# Patient Record
Sex: FEMALE | Race: BLACK OR AFRICAN AMERICAN | NOT HISPANIC OR LATINO | Employment: FULL TIME | URBAN - METROPOLITAN AREA
[De-identification: names, ages, dates, MRNs, and addresses within clinical notes are randomized per-mention and may not be internally consistent; named-entity substitution may affect disease eponyms.]

---

## 2024-07-12 ENCOUNTER — OFFICE VISIT (OUTPATIENT)
Dept: FAMILY MEDICINE CLINIC | Facility: CLINIC | Age: 29
End: 2024-07-12
Payer: COMMERCIAL

## 2024-07-12 VITALS
TEMPERATURE: 97.2 F | HEIGHT: 66 IN | BODY MASS INDEX: 31.6 KG/M2 | HEART RATE: 79 BPM | RESPIRATION RATE: 18 BRPM | SYSTOLIC BLOOD PRESSURE: 120 MMHG | DIASTOLIC BLOOD PRESSURE: 80 MMHG | OXYGEN SATURATION: 100 % | WEIGHT: 196.6 LBS

## 2024-07-12 DIAGNOSIS — N92.1 MENORRHAGIA WITH IRREGULAR CYCLE: ICD-10-CM

## 2024-07-12 DIAGNOSIS — D50.9 IRON DEFICIENCY ANEMIA, UNSPECIFIED IRON DEFICIENCY ANEMIA TYPE: ICD-10-CM

## 2024-07-12 DIAGNOSIS — R35.0 URINARY FREQUENCY: ICD-10-CM

## 2024-07-12 DIAGNOSIS — Z13.1 DIABETES MELLITUS SCREENING: ICD-10-CM

## 2024-07-12 DIAGNOSIS — Z13.220 LIPID SCREENING: ICD-10-CM

## 2024-07-12 DIAGNOSIS — R53.83 FATIGUE, UNSPECIFIED TYPE: ICD-10-CM

## 2024-07-12 DIAGNOSIS — Z76.89 ENCOUNTER TO ESTABLISH CARE: Primary | ICD-10-CM

## 2024-07-12 DIAGNOSIS — Z33.2 ABORTION IN FIRST TRIMESTER: ICD-10-CM

## 2024-07-12 LAB
SL AMB  POCT GLUCOSE, UA: NEGATIVE
SL AMB LEUKOCYTE ESTERASE,UA: NEGATIVE
SL AMB POCT BILIRUBIN,UA: NEGATIVE
SL AMB POCT BLOOD,UA: ABNORMAL
SL AMB POCT CLARITY,UA: ABNORMAL
SL AMB POCT COLOR,UA: ABNORMAL
SL AMB POCT KETONES,UA: 5
SL AMB POCT NITRITE,UA: NEGATIVE
SL AMB POCT PH,UA: 6
SL AMB POCT SPECIFIC GRAVITY,UA: 1
SL AMB POCT URINE PROTEIN: 15
SL AMB POCT UROBILINOGEN: 0.2

## 2024-07-12 PROCEDURE — 81002 URINALYSIS NONAUTO W/O SCOPE: CPT | Performed by: PHYSICIAN ASSISTANT

## 2024-07-12 PROCEDURE — 99203 OFFICE O/P NEW LOW 30 MIN: CPT | Performed by: PHYSICIAN ASSISTANT

## 2024-07-12 NOTE — PROGRESS NOTES
FAMILY PRACTICE OFFICE VISIT  [facility] - [facility]       NAME: Yun Mancia  AGE: 29 y.o. SEX: female       : 1995        MRN: [id number]    DATE: 2024  TIME: 3:30 PM    Assessment and Plan     Problem List Items Addressed This Visit          Blood    Iron deficiency anemia     Check CBC and iron studies.  Referred to hematology for possible iron infusions.         Relevant Orders    Ambulatory Referral to Hematology / Oncology    CBC and differential    Iron Panel (Includes Ferritin, Iron Sat%, Iron, and TIBC)       Obstetrics/Gynecology     in first trimester     Reports having an  via taking a pill.  She reports having 2 heavy menses since then.  Concern for possible complications.  Referred to OB/GYN.  Check hCG.         Relevant Orders    Ambulatory Referral to Obstetrics / Gynecology       Other    Urinary frequency     Check urine dip.  Positive for blood but no other signs of infection.  Sent for culture.         Relevant Orders    POCT urine dip (Completed)    Urine culture (Completed)    Fatigue     Labs as ordered.         Relevant Orders    TSH, 3rd generation with Free T4 reflex     Other Visit Diagnoses       Encounter to establish care    -  Primary    Menorrhagia with irregular cycle        Relevant Orders    Ambulatory Referral to Obstetrics / Gynecology    CBC and differential    hCG, quantitative    Lipid screening        Relevant Orders    Lipid Panel with Direct LDL reflex    Diabetes mellitus screening        Relevant Orders    Comprehensive metabolic panel                  Chief Complaint     Chief Complaint   Patient presents with    Establish Care       History of Present Illness   Yun Mancia is a 29 y.o.-year-old female who presents for establishing care.     Patient just moved here in April from [place].     Patient notes that she has anemia - has low iron and has been feeling tired - notes that she had a  transfusion - was taking iron twice daily but ran out 2 weeks ago     Notes that she has concern about an  (pill) that she had May 17 (was about 6 weeks) and then had US early  that showed a blood clot in the uterus - has had 2 heavy menses since then (notes hx of irreg menses with heavy flow about 2 times a month) - not having breast tenderness - when not having period, notes that she has a white discharge that smells abnormal - also notes that she needs to urinate frequently since end                Review of Systems   Review of Systems   Constitutional:  Negative for chills and fever.   HENT:  Negative for congestion, postnasal drip and rhinorrhea.    Respiratory:  Negative for shortness of breath.    Cardiovascular:  Negative for chest pain and palpitations.   Gastrointestinal:  Negative for abdominal pain, diarrhea, nausea and vomiting.   Genitourinary:  Positive for menstrual problem.   Neurological:  Positive for light-headedness and headaches. Negative for dizziness and syncope.   Psychiatric/Behavioral:  Negative for dysphoric mood. The patient is nervous/anxious.        Active Problem List     Patient Active Problem List   Diagnosis    Iron deficiency anemia    Retained products of conception after miscarriage    Urinary frequency     in first trimester    Fatigue         Past Medical History:  Past Medical History:   Diagnosis Date    Anemia        Past Surgical History:  Past Surgical History:   Procedure Laterality Date     SECTION      DILATION AND CURETTAGE OF UTERUS N/A 2024    Procedure: DILATATION AND CURETTAGE (D&C) SUCTION PRODUCT OF CONCEPTION;  Surgeon: Pedro Luis Oropeza MD;  Location:  MAIN OR;  Service: Gynecology    WISDOM TOOTH EXTRACTION      x4 - age 27       Family History:  Family History   Problem Relation Age of Onset    No Known Problems Mother     No Known Problems Father     No Known Problems Sister     No Known Problems Brother     No  Known Problems Brother     No Known Problems Brother     Autism Son     ADD / ADHD Son     No Known Problems Daughter     Asthma Daughter     Anemia Maternal Grandmother     Hypertension Maternal Grandmother     Hyperlipidemia Maternal Grandmother     Diabetes Maternal Grandmother     Hypertension Paternal Grandmother     Hyperlipidemia Paternal Grandmother     Diabetes Paternal Grandmother     Anemia Maternal Aunt        Social History:  Social History     Socioeconomic History    Marital status: Single     Spouse name: Not on file    Number of children: Not on file    Years of education: Not on file    Highest education level: Not on file   Occupational History    Not on file   Tobacco Use    Smoking status: Never    Smokeless tobacco: Never   Vaping Use    Vaping status: Never Used   Substance and Sexual Activity    Alcohol use: Yes     Comment: socially - once every few months    Drug use: Never    Sexual activity: Yes     Partners: Male   Other Topics Concern    Not on file   Social History Narrative    Not on file     Social Determinants of Health     Financial Resource Strain: Low Risk  (7/13/2022)    Received from [facility]    Overall Financial Resource Strain (CARDIA)     Difficulty of Paying Living Expenses: Not hard at all   Food Insecurity: No Food Insecurity (7/13/2022)    Received from [facility]    Food Insecurity     Within the past 12 months, you worried that your food would run out before you got the money to buy more.: 1     Within the past 12 months, the food you bought just didn't last and you didn't have money to get more.: 1   Transportation Needs: No Transportation Needs (7/14/2022)    Received from [facility]    Transportation Needs     In the past 12 months, has lack of transportation kept you from medical appointments or from getting medications?: 2     In the past 12 months, has lack of transportation kept you from meetings, work, or from getting things needed for daily living?: 2  "  Physical Activity: Insufficiently Active (7/13/2022)    Received from [facility]    Physical Activity     On average, how many days per week do you engage in moderate to strenuous exercise (like a brisk walk)?: 1 day     On average, how many minutes do you engage in exercise at this level?: 30 min   Stress: Stress Concern Present (7/13/2022)    Received from [facility]    [employer] [place] [employer] - Occupational Stress Questionnaire     Feeling of Stress : Rather much   Social Connections: Socially Isolated (7/13/2022)    Received from [facility]    Social Connection and Isolation Panel [NHANES]     Frequency of Communication with Friends and Family: Never     Frequency of Social Gatherings with Friends and Family: Never     Attends Latter-day Services: Never     Active Member of Clubs or Organizations: No     Attends Club or Organization Meetings: Never     Marital Status:    Intimate Partner Violence: Not At Risk (8/16/2023)    Received from [facility]     Threatened: Not on file     Insulted: Not on file     Physically Hurt : Not on file     Scream: Not on file   Housing Stability: At Risk (8/16/2023)    Received from [facility]     Living Situation: Not on file     Housing Problems: Not on file       Objective     Vitals:    07/12/24 1200   BP: 120/80   BP Location: Left arm   Cuff Size: Large   Pulse: 79   Resp: 18   Temp: (!) 97.2 °F (36.2 °C)   TempSrc: Tympanic   SpO2: 100%   Weight: 89.2 kg (196 lb 9.6 oz)   Height: 5' 6\" (1.676 m)     Wt Readings from Last 3 Encounters:   07/12/24 89.2 kg (196 lb 9.6 oz)       Physical Exam  Vitals reviewed.   Constitutional:       General: She is not in acute distress.     Appearance: Normal appearance. She is well-developed. She is obese. She is not ill-appearing.   HENT:      Head: Normocephalic and atraumatic.   Neck:      Thyroid: No thyromegaly.   Cardiovascular:      Rate and Rhythm: Normal rate and " regular rhythm.      Pulses: Normal pulses.      Heart sounds: Normal heart sounds. No murmur heard.  Pulmonary:      Effort: Pulmonary effort is normal.      Breath sounds: Normal breath sounds. No wheezing, rhonchi or rales.   Abdominal:      General: Bowel sounds are normal. There is no distension.      Palpations: Abdomen is soft. There is no mass.      Tenderness: There is no abdominal tenderness. There is no right CVA tenderness, left CVA tenderness or guarding.   Musculoskeletal:      Cervical back: Neck supple.      Right lower leg: No edema.      Left lower leg: No edema.   Lymphadenopathy:      Cervical: No cervical adenopathy.   Skin:     General: Skin is warm and dry.   Neurological:      Mental Status: She is alert.   Psychiatric:         Mood and Affect: Mood normal.         Behavior: Behavior normal.         Thought Content: Thought content normal.         Judgment: Judgment normal.         Pertinent Laboratory/Diagnostic Studies:  Results for orders placed or performed in visit on 07/12/24   Urine culture    Specimen: Urine, Clean Catch   Result Value Ref Range    Urine Culture No Growth <1000 cfu/mL    POCT urine dip   Result Value Ref Range    LEUKOCYTE ESTERASE,UA negative     NITRITE,UA negative     SL AMB POCT UROBILINOGEN 0.2     POCT URINE PROTEIN 15      PH,UA 6.0     BLOOD,UA +++     SPECIFIC GRAVITY,UA 1.005     KETONES,UA 5     BILIRUBIN,UA negative     GLUCOSE, UA negative      COLOR,UA pinkish (patient has her period)     CLARITY,UA cloudy        Orders Placed This Encounter   Procedures    Urine culture    Comprehensive metabolic panel    CBC and differential    Lipid Panel with Direct LDL reflex    TSH, 3rd generation with Free T4 reflex    hCG, quantitative    Ambulatory Referral to Hematology / Oncology    Ambulatory Referral to Obstetrics / Gynecology    POCT urine dip       ALLERGIES:  No Known Allergies    Current Medications     No current outpatient medications on file.     No  current facility-administered medications for this visit.         Health Maintenance     Health Maintenance   Topic Date Due    Hepatitis C Screening  Never done    Depression Follow-up Plan  Never done    HIV Screening  Never done    Annual Physical  Never done    DTaP,Tdap,and Td Vaccines (1 - Tdap) Never done    Cervical Cancer Screening  Never done    COVID-19 Vaccine (1 - 2023-24 season) Never done    Influenza Vaccine (1) 09/01/2024    Depression Screening  07/12/2025    Zoster Vaccine (1 of 2) 04/05/2046    RSV Vaccine Age 60+ Years (1 - 1-dose 60+ series) 04/05/2056    RSV Vaccine age 0-20 Months  Aged Out    Pneumococcal Vaccine: Pediatrics (0 to 5 Years) and At-Risk Patients (6 to 64 Years)  Aged Out    HIB Vaccine  Aged Out    IPV Vaccine  Aged Out    Hepatitis A Vaccine  Aged Out    Meningococcal ACWY Vaccine  Aged Out    HPV Vaccine  Aged Out       There is no immunization history on file for this patient.    Flor Sheikh PA-C  8/4/2024 3:30 PM  [facility]

## 2024-07-13 LAB — BACTERIA UR CULT: NORMAL

## 2024-07-15 ENCOUNTER — HOSPITAL ENCOUNTER (EMERGENCY)
Facility: HOSPITAL | Age: 29
Discharge: HOME | End: 2024-07-16
Attending: EMERGENCY MEDICINE
Payer: COMMERCIAL

## 2024-07-15 ENCOUNTER — TELEPHONE (OUTPATIENT)
Dept: FAMILY MEDICINE CLINIC | Facility: CLINIC | Age: 29
End: 2024-07-15

## 2024-07-15 ENCOUNTER — APPOINTMENT (EMERGENCY)
Dept: RADIOLOGY | Facility: HOSPITAL | Age: 29
End: 2024-07-15
Payer: COMMERCIAL

## 2024-07-15 ENCOUNTER — NURSE TRIAGE (OUTPATIENT)
Age: 29
End: 2024-07-15

## 2024-07-15 DIAGNOSIS — O03.4 RETAINED PRODUCTS OF CONCEPTION AFTER MISCARRIAGE: Primary | ICD-10-CM

## 2024-07-15 DIAGNOSIS — N93.9 ABNORMAL VAGINAL BLEEDING: ICD-10-CM

## 2024-07-15 LAB
ALBUMIN SERPL BCG-MCNC: 3.5 G/DL (ref 3.5–5)
ALP SERPL-CCNC: 47 U/L (ref 34–104)
ALT SERPL W P-5'-P-CCNC: 10 U/L (ref 7–52)
ANION GAP SERPL CALCULATED.3IONS-SCNC: 8 MMOL/L (ref 4–13)
AST SERPL W P-5'-P-CCNC: 12 U/L (ref 13–39)
B-HCG SERPL-ACNC: 5.3 MIU/ML (ref 0–5)
BASOPHILS # BLD AUTO: 0.07 THOUSANDS/ÂΜL (ref 0–0.1)
BASOPHILS NFR BLD AUTO: 1 % (ref 0–1)
BILIRUB SERPL-MCNC: 0.18 MG/DL (ref 0.2–1)
BUN SERPL-MCNC: 10 MG/DL (ref 5–25)
CALCIUM SERPL-MCNC: 8.8 MG/DL (ref 8.4–10.2)
CHLORIDE SERPL-SCNC: 106 MMOL/L (ref 96–108)
CO2 SERPL-SCNC: 25 MMOL/L (ref 21–32)
CREAT SERPL-MCNC: 0.61 MG/DL (ref 0.6–1.3)
EOSINOPHIL # BLD AUTO: 0.12 THOUSAND/ÂΜL (ref 0–0.61)
EOSINOPHIL NFR BLD AUTO: 2 % (ref 0–6)
ERYTHROCYTE [DISTWIDTH] IN BLOOD BY AUTOMATED COUNT: 16.1 % (ref 11.6–15.1)
FERRITIN SERPL-MCNC: 3 NG/ML (ref 11–307)
GFR SERPL CREATININE-BSD FRML MDRD: 123 ML/MIN/1.73SQ M
GLUCOSE SERPL-MCNC: 95 MG/DL (ref 65–140)
HCT VFR BLD AUTO: 25.4 % (ref 34.8–46.1)
HGB BLD-MCNC: 7.6 G/DL (ref 11.5–15.4)
IMM GRANULOCYTES # BLD AUTO: 0.01 THOUSAND/UL (ref 0–0.2)
IMM GRANULOCYTES NFR BLD AUTO: 0 % (ref 0–2)
IRON SATN MFR SERPL: 5 % (ref 15–50)
IRON SERPL-MCNC: 20 UG/DL (ref 50–212)
LYMPHOCYTES # BLD AUTO: 2.65 THOUSANDS/ÂΜL (ref 0.6–4.47)
LYMPHOCYTES NFR BLD AUTO: 46 % (ref 14–44)
MCH RBC QN AUTO: 24.1 PG (ref 26.8–34.3)
MCHC RBC AUTO-ENTMCNC: 29.9 G/DL (ref 31.4–37.4)
MCV RBC AUTO: 81 FL (ref 82–98)
MONOCYTES # BLD AUTO: 0.47 THOUSAND/ÂΜL (ref 0.17–1.22)
MONOCYTES NFR BLD AUTO: 8 % (ref 4–12)
NEUTROPHILS # BLD AUTO: 2.5 THOUSANDS/ÂΜL (ref 1.85–7.62)
NEUTS SEG NFR BLD AUTO: 43 % (ref 43–75)
NRBC BLD AUTO-RTO: 0 /100 WBCS
PLATELET # BLD AUTO: 487 THOUSANDS/UL (ref 149–390)
PMV BLD AUTO: 8.7 FL (ref 8.9–12.7)
POTASSIUM SERPL-SCNC: 3.8 MMOL/L (ref 3.5–5.3)
PROT SERPL-MCNC: 6.5 G/DL (ref 6.4–8.4)
RBC # BLD AUTO: 3.15 MILLION/UL (ref 3.81–5.12)
SODIUM SERPL-SCNC: 139 MMOL/L (ref 135–147)
TIBC SERPL-MCNC: 389 UG/DL (ref 250–450)
UIBC SERPL-MCNC: 369 UG/DL (ref 155–355)
WBC # BLD AUTO: 5.82 THOUSAND/UL (ref 4.31–10.16)

## 2024-07-15 PROCEDURE — 85025 COMPLETE CBC W/AUTO DIFF WBC: CPT | Performed by: STUDENT IN AN ORGANIZED HEALTH CARE EDUCATION/TRAINING PROGRAM

## 2024-07-15 PROCEDURE — 82728 ASSAY OF FERRITIN: CPT | Performed by: STUDENT IN AN ORGANIZED HEALTH CARE EDUCATION/TRAINING PROGRAM

## 2024-07-15 PROCEDURE — 99284 EMERGENCY DEPT VISIT MOD MDM: CPT

## 2024-07-15 PROCEDURE — 83540 ASSAY OF IRON: CPT | Performed by: STUDENT IN AN ORGANIZED HEALTH CARE EDUCATION/TRAINING PROGRAM

## 2024-07-15 PROCEDURE — 80053 COMPREHEN METABOLIC PANEL: CPT | Performed by: STUDENT IN AN ORGANIZED HEALTH CARE EDUCATION/TRAINING PROGRAM

## 2024-07-15 PROCEDURE — 84702 CHORIONIC GONADOTROPIN TEST: CPT | Performed by: STUDENT IN AN ORGANIZED HEALTH CARE EDUCATION/TRAINING PROGRAM

## 2024-07-15 PROCEDURE — 76856 US EXAM PELVIC COMPLETE: CPT

## 2024-07-15 PROCEDURE — 36415 COLL VENOUS BLD VENIPUNCTURE: CPT | Performed by: STUDENT IN AN ORGANIZED HEALTH CARE EDUCATION/TRAINING PROGRAM

## 2024-07-15 PROCEDURE — 76830 TRANSVAGINAL US NON-OB: CPT

## 2024-07-15 PROCEDURE — 83550 IRON BINDING TEST: CPT | Performed by: STUDENT IN AN ORGANIZED HEALTH CARE EDUCATION/TRAINING PROGRAM

## 2024-07-15 NOTE — TELEPHONE ENCOUNTER
"Faby called to schedule NP appt to f/u to chemical EAB 2024 with [facility].  She is new to the area and is looking to estabish care with OB/GYN.  She has been bleeding since EAB- reports soaking 6 super tampons daily.  Reports weakness w/ h/o anemia/transfusion done in March.   Received + HPT 2 weeks ago and again this week.    Advised to be evaluated in ED due to heavy bleeding for almost 2 months w/h/o anemia and blood transfusion in 2024.  +HPT after chemical induced EAB.    Pateint in agreement, will call to schedule f/u with GYN once released from ED.         Reason for Disposition  • MODERATE vaginal bleeding (e.g., soaking pad or tampon per hour and present > 6 hours; 1 menstrual cup every 6 hours)    Answer Assessment - Initial Assessment Questions  1. AMOUNT: \"Describe the bleeding that you are having.\"     - SPOTTING: spotting, or pinkish / brownish mucous discharge; does not fill panti-liner or pad     - MILD:  less than 1 pad / hour; less than patient's usual menstrual bleeding    - MODERATE: 1-2 pads / hour; 1 menstrual cup every 6 hours; small-medium blood clots (e.g., pea, grape, small coin)    - SEVERE: soaking 2 or more pads/hour for 2 or more hours; 1 menstrual cup every 2 hours; bleeding not contained by pads or continuous red blood from vagina; large blood clots (e.g., golf ball, large coin)       Moderate to severe- changing large size tampon 6 x times daily   2. ONSET: \"When did the bleeding begin?\" \"Is it continuing now?\"      Since May 17-at time of chemical   3. MENSTRUAL PERIOD: \"When was the last normal menstrual period?\" \"How is this different than your period?\"      Has not had period since prior to pregnancy and chemical induced AB  4. REGULARITY: \"How regular are your periods?\"      N/a  5. ABDOMINAL PAIN: \"Do you have any pain?\" \"How bad is the pain?\"  (e.g., Scale 1-10; mild, moderate, or severe)    - MILD (1-3): doesn't interfere with normal activities, abdomen soft and " "not tender to touch     - MODERATE (4-7): interferes with normal activities or awakens from sleep, tender to touch     - SEVERE (8-10): excruciating pain, doubled over, unable to do any normal activities cr      Cramping type discomfort  6. PREGNANCY: \"Could you be pregnant?\" \"Are you sexually active?\" \"Did you recently give birth?\"      SAB 6/17/2024  7. BREASTFEEDING: \"Are you breastfeeding?\"      N/a  8. HORMONES: \"Are you taking any hormone medications, prescription or OTC?\" (e.g., birth control pills, estrogen)      N/a  9. BLOOD THINNERS: \"Do you take any blood thinners?\" (e.g., Coumadin/warfarin, Pradaxa/dabigatran, aspirin)      N/a  10. CAUSE: \"What do you think is causing the bleeding?\" (e.g., recent gyn surgery, recent gyn procedure; known bleeding disorder, cervical cancer, polycystic ovarian disease, fibroids)          Recent SAB at [facility]  11. HEMODYNAMIC STATUS: \"Are you weak or feeling lightheaded?\" If Yes, ask: \"Can you stand and walk normally?\"         Weakness- h/o iron transfusion in March due to low hemoglobin/anemia    Protocols used: Vaginal Bleeding - Abnormal-ADULT-OH    "

## 2024-07-15 NOTE — Clinical Note
Yun Mancia was seen and treated in our emergency department on 7/15/2024.    No restrictions            Diagnosis:     Yun  may return to work on return date.    She may return on this date: 07/18/2024         If you have any questions or concerns, please don't hesitate to call.      Diony Zapata MD    ______________________________           _______________          _______________  Hospital Representative                              Date                                Time

## 2024-07-15 NOTE — TELEPHONE ENCOUNTER
----- Message from Flor Sheikh PA-C sent at 7/14/2024  4:50 PM EDT -----  Please let the patient know that her urine culture did not show any bacterial growth.  There is no UTI.  Please encourage her to reach out with any ongoing symptoms or concerns.

## 2024-07-15 NOTE — TELEPHONE ENCOUNTER
Regarding: bleeding goes through 6 large tampons in 6 hours daily  ----- Message from Catrachita DUFF sent at 7/15/2024 11:54 AM EDT -----  Pt called in , PCP had requested stat referral for this pt, bleeding goes through 6 large tampons in 6 hours, had  in May and would not stop bleeding.

## 2024-07-15 NOTE — TELEPHONE ENCOUNTER
Called Pt to discuss labs no answer LM advising CB. Upon return please go over PCP message above/below

## 2024-07-16 ENCOUNTER — ANESTHESIA (EMERGENCY)
Dept: PERIOP | Facility: HOSPITAL | Age: 29
End: 2024-07-16
Payer: COMMERCIAL

## 2024-07-16 ENCOUNTER — ANESTHESIA EVENT (EMERGENCY)
Dept: PERIOP | Facility: HOSPITAL | Age: 29
End: 2024-07-16
Payer: COMMERCIAL

## 2024-07-16 VITALS
HEART RATE: 78 BPM | TEMPERATURE: 97 F | DIASTOLIC BLOOD PRESSURE: 75 MMHG | SYSTOLIC BLOOD PRESSURE: 119 MMHG | OXYGEN SATURATION: 98 % | RESPIRATION RATE: 18 BRPM

## 2024-07-16 PROBLEM — O03.4 RETAINED PRODUCTS OF CONCEPTION AFTER MISCARRIAGE: Status: ACTIVE | Noted: 2024-07-16

## 2024-07-16 LAB
ABO GROUP BLD: NORMAL
ABO GROUP BLD: NORMAL
BLD GP AB SCN SERPL QL: NEGATIVE
RH BLD: POSITIVE
RH BLD: POSITIVE
SPECIMEN EXPIRATION DATE: NORMAL

## 2024-07-16 PROCEDURE — 36415 COLL VENOUS BLD VENIPUNCTURE: CPT | Performed by: STUDENT IN AN ORGANIZED HEALTH CARE EDUCATION/TRAINING PROGRAM

## 2024-07-16 PROCEDURE — 86850 RBC ANTIBODY SCREEN: CPT | Performed by: STUDENT IN AN ORGANIZED HEALTH CARE EDUCATION/TRAINING PROGRAM

## 2024-07-16 PROCEDURE — 86923 COMPATIBILITY TEST ELECTRIC: CPT

## 2024-07-16 PROCEDURE — 86900 BLOOD TYPING SEROLOGIC ABO: CPT | Performed by: STUDENT IN AN ORGANIZED HEALTH CARE EDUCATION/TRAINING PROGRAM

## 2024-07-16 PROCEDURE — 96365 THER/PROPH/DIAG IV INF INIT: CPT

## 2024-07-16 PROCEDURE — 59812 TREATMENT OF MISCARRIAGE: CPT | Performed by: STUDENT IN AN ORGANIZED HEALTH CARE EDUCATION/TRAINING PROGRAM

## 2024-07-16 PROCEDURE — 99285 EMERGENCY DEPT VISIT HI MDM: CPT | Performed by: EMERGENCY MEDICINE

## 2024-07-16 PROCEDURE — 99205 OFFICE O/P NEW HI 60 MIN: CPT | Performed by: STUDENT IN AN ORGANIZED HEALTH CARE EDUCATION/TRAINING PROGRAM

## 2024-07-16 PROCEDURE — 86901 BLOOD TYPING SEROLOGIC RH(D): CPT | Performed by: STUDENT IN AN ORGANIZED HEALTH CARE EDUCATION/TRAINING PROGRAM

## 2024-07-16 PROCEDURE — 96366 THER/PROPH/DIAG IV INF ADDON: CPT

## 2024-07-16 PROCEDURE — 88305 TISSUE EXAM BY PATHOLOGIST: CPT | Performed by: STUDENT IN AN ORGANIZED HEALTH CARE EDUCATION/TRAINING PROGRAM

## 2024-07-16 RX ORDER — IBUPROFEN 400 MG/1
600 TABLET ORAL EVERY 6 HOURS PRN
Status: CANCELLED | OUTPATIENT
Start: 2024-07-16

## 2024-07-16 RX ORDER — ONDANSETRON 2 MG/ML
4 INJECTION INTRAMUSCULAR; INTRAVENOUS EVERY 6 HOURS PRN
Status: CANCELLED | OUTPATIENT
Start: 2024-07-16

## 2024-07-16 RX ORDER — SODIUM CHLORIDE, SODIUM LACTATE, POTASSIUM CHLORIDE, CALCIUM CHLORIDE 600; 310; 30; 20 MG/100ML; MG/100ML; MG/100ML; MG/100ML
INJECTION, SOLUTION INTRAVENOUS CONTINUOUS PRN
Status: DISCONTINUED | OUTPATIENT
Start: 2024-07-16 | End: 2024-07-16

## 2024-07-16 RX ORDER — LIDOCAINE HYDROCHLORIDE 10 MG/ML
INJECTION, SOLUTION EPIDURAL; INFILTRATION; INTRACAUDAL; PERINEURAL AS NEEDED
Status: DISCONTINUED | OUTPATIENT
Start: 2024-07-16 | End: 2024-07-16

## 2024-07-16 RX ORDER — PROMETHAZINE HYDROCHLORIDE 25 MG/ML
12.5 INJECTION, SOLUTION INTRAMUSCULAR; INTRAVENOUS ONCE AS NEEDED
Status: DISCONTINUED | OUTPATIENT
Start: 2024-07-16 | End: 2024-07-16 | Stop reason: HOSPADM

## 2024-07-16 RX ORDER — ONDANSETRON 2 MG/ML
4 INJECTION INTRAMUSCULAR; INTRAVENOUS ONCE AS NEEDED
Status: DISCONTINUED | OUTPATIENT
Start: 2024-07-16 | End: 2024-07-16 | Stop reason: HOSPADM

## 2024-07-16 RX ORDER — ONDANSETRON 2 MG/ML
INJECTION INTRAMUSCULAR; INTRAVENOUS AS NEEDED
Status: DISCONTINUED | OUTPATIENT
Start: 2024-07-16 | End: 2024-07-16

## 2024-07-16 RX ORDER — FENTANYL CITRATE/PF 50 MCG/ML
25 SYRINGE (ML) INJECTION
Status: DISCONTINUED | OUTPATIENT
Start: 2024-07-16 | End: 2024-07-16 | Stop reason: HOSPADM

## 2024-07-16 RX ORDER — DEXAMETHASONE SODIUM PHOSPHATE 10 MG/ML
INJECTION, SOLUTION INTRAMUSCULAR; INTRAVENOUS AS NEEDED
Status: DISCONTINUED | OUTPATIENT
Start: 2024-07-16 | End: 2024-07-16

## 2024-07-16 RX ORDER — ACETAMINOPHEN 325 MG/1
975 TABLET ORAL EVERY 6 HOURS PRN
Status: CANCELLED | OUTPATIENT
Start: 2024-07-16

## 2024-07-16 RX ORDER — MIDAZOLAM HYDROCHLORIDE 2 MG/2ML
INJECTION, SOLUTION INTRAMUSCULAR; INTRAVENOUS AS NEEDED
Status: DISCONTINUED | OUTPATIENT
Start: 2024-07-16 | End: 2024-07-16

## 2024-07-16 RX ORDER — FENTANYL CITRATE 50 UG/ML
INJECTION, SOLUTION INTRAMUSCULAR; INTRAVENOUS AS NEEDED
Status: DISCONTINUED | OUTPATIENT
Start: 2024-07-16 | End: 2024-07-16

## 2024-07-16 RX ORDER — MAGNESIUM HYDROXIDE 1200 MG/15ML
LIQUID ORAL AS NEEDED
Status: DISCONTINUED | OUTPATIENT
Start: 2024-07-16 | End: 2024-07-16 | Stop reason: HOSPADM

## 2024-07-16 RX ORDER — HYDROMORPHONE HCL/PF 1 MG/ML
0.5 SYRINGE (ML) INJECTION
Status: DISCONTINUED | OUTPATIENT
Start: 2024-07-16 | End: 2024-07-16 | Stop reason: HOSPADM

## 2024-07-16 RX ORDER — PROPOFOL 10 MG/ML
INJECTION, EMULSION INTRAVENOUS AS NEEDED
Status: DISCONTINUED | OUTPATIENT
Start: 2024-07-16 | End: 2024-07-16

## 2024-07-16 RX ADMIN — PROPOFOL 180 MG: 10 INJECTION, EMULSION INTRAVENOUS at 02:22

## 2024-07-16 RX ADMIN — ONDANSETRON 4 MG: 2 INJECTION INTRAMUSCULAR; INTRAVENOUS at 02:22

## 2024-07-16 RX ADMIN — DEXAMETHASONE SODIUM PHOSPHATE 5 MG: 10 INJECTION, SOLUTION INTRAMUSCULAR; INTRAVENOUS at 02:22

## 2024-07-16 RX ADMIN — FENTANYL CITRATE 50 MCG: 50 INJECTION INTRAMUSCULAR; INTRAVENOUS at 02:37

## 2024-07-16 RX ADMIN — FENTANYL CITRATE 50 MCG: 50 INJECTION INTRAMUSCULAR; INTRAVENOUS at 02:22

## 2024-07-16 RX ADMIN — SODIUM CHLORIDE, SODIUM LACTATE, POTASSIUM CHLORIDE, AND CALCIUM CHLORIDE: .6; .31; .03; .02 INJECTION, SOLUTION INTRAVENOUS at 02:19

## 2024-07-16 RX ADMIN — MIDAZOLAM 2 MG: 1 INJECTION INTRAMUSCULAR; INTRAVENOUS at 02:20

## 2024-07-16 RX ADMIN — DOXYCYCLINE 200 MG: 100 INJECTION, POWDER, LYOPHILIZED, FOR SOLUTION INTRAVENOUS at 02:24

## 2024-07-16 RX ADMIN — LIDOCAINE HYDROCHLORIDE 5 ML: 10 INJECTION, SOLUTION EPIDURAL; INFILTRATION; INTRACAUDAL; PERINEURAL at 02:22

## 2024-07-16 NOTE — ED ATTENDING ATTESTATION
Final Diagnoses:   No diagnosis found.  ED Course as of 07/15/24 2330   Mon Jul 15, 2024   2206 WBC: 5.82   2206 Hemoglobin(!): 7.6  Hmm.    MCV(!): 81   2244 HCG QUANTITATIVE(!): 5.3       I, Hiren Valdez MD, saw and evaluated the patient. All available labs and X-rays were ordered by me or the resident / non-physician and have been reviewed by myself. I discussed the patient with the resident / non-physician and agree with the resident's / non-physician practitioner's findings and plan as documented in the resident's / non-physician practicitioner's note, except where noted.   At this point, I agree with the current assessment done in the ED.   I was present during key portions of all procedures performed unless otherwise stated.     HPI:  NURSING TRIAGE:    This is a 29 y.o. female presenting for evaluation of Vaginal bleeding.  She had she had an  done in May via medications.  Since then she has been having nearly continuous vaginal bleeding every day, sometimes up to 6 tampons a day.  No fevers chills nausea vomiting chest pain shortness of breath.  No belly pain back pain.  No falls or injuries.  Because of insurance problems she has been unable to follow-up with OB Kaitlynn again.  She called her family doctor and OB her new one who sent her in for further evaluation.   Chief Complaint   Patient presents with    Vaginal Bleeding     Pt got an  back in may and since then has been having vaginal bleeding. Pt states she bleeds through 6 ultra tampons in a day. Pt has a hx of anemia       PHYSICAL: ASSESSMENT + PLAN:   General: VS reviewed  Appears in NAD  awake, alert.   Well-nourished, well-developed. Appears stated age.   Speaking normally in full sentences.   Head: Normocephalic, atraumatic  Eyes: EOM-I. No diplopia.   No hyphema.   No subconjunctival hemorrhages.  Symmetrical lids.   ENT: Atraumatic external nose and ears.    MMM  No malocclusion. No stridor. Normal phonation. No  drooling. Normal swallowing.   Neck: No JVD.  CV: No pallor noted  Lungs:   No tachypnea  No respiratory distress  Abd: soft nt nd no rebound/guarding  MSK:   FROM spontaneously  Skin: Dry, intact.   Neuro: Awake, alert, GCS15, CN II-XII grossly intact.   Motor grossly intact.  Psychiatric/Behavioral: interacting normally; appropriate mood/affect.    Exam: deferred    Vitals:    07/15/24 2014   BP: (!) 140/106   BP Location: Left arm   Pulse: 83   Resp: 20   Temp: 97.9 °F (36.6 °C)   TempSrc: Temporal   SpO2: 100%    CBC for anemia  BMP supportive measures  TXA in interim.     Urine pregnancy to make sure vs quant     There are no obvious limitations to social determinants of care.   Nursing note reviewed.   Vitals reviewed.   Orders placed by myself and/or advanced practitioner / resident.    Previous chart was reviewed  No language barrier.   History obtained from patient.    There are no limitations to the history obtained:     Past Medical: Past Surgical:    has a past medical history of Anemia.  has a past surgical history that includes  section () and [place] tooth extraction.   Social: Cardiac (Echo/Cath)   Social History     Substance and Sexual Activity   Alcohol Use Yes    Comment: socially - once every few months     Social History     Tobacco Use   Smoking Status Never   Smokeless Tobacco Never     Social History     Substance and Sexual Activity   Drug Use Never    No results found for this or any previous visit.    No results found for this or any previous visit.    No results found for this or any previous visit.     Labs: Imaging:   Labs Reviewed   CBC AND DIFFERENTIAL - Abnormal       Result Value Ref Range Status    WBC 5.82  4.31 - 10.16 Thousand/uL Final    RBC 3.15 (*) 3.81 - 5.12 Million/uL Final    Hemoglobin 7.6 (*) 11.5 - 15.4 g/dL Final    Hematocrit 25.4 (*) 34.8 - 46.1 % Final    MCV 81 (*) 82 - 98 fL Final    MCH 24.1 (*) 26.8 - 34.3 pg Final    MCHC 29.9 (*) 31.4 - 37.4  g/dL Final    RDW 16.1 (*) 11.6 - 15.1 % Final    MPV 8.7 (*) 8.9 - 12.7 fL Final    Platelets 487 (*) 149 - 390 Thousands/uL Final    nRBC 0  /100 WBCs Final    Segmented % 43  43 - 75 % Final    Immature Grans % 0  0 - 2 % Final    Lymphocytes % 46 (*) 14 - 44 % Final    Monocytes % 8  4 - 12 % Final    Eosinophils Relative 2  0 - 6 % Final    Basophils Relative 1  0 - 1 % Final    Absolute Neutrophils 2.50  1.85 - 7.62 Thousands/µL Final    Absolute Immature Grans 0.01  0.00 - 0.20 Thousand/uL Final    Absolute Lymphocytes 2.65  0.60 - 4.47 Thousands/µL Final    Absolute Monocytes 0.47  0.17 - 1.22 Thousand/µL Final    Eosinophils Absolute 0.12  0.00 - 0.61 Thousand/µL Final    Basophils Absolute 0.07  0.00 - 0.10 Thousands/µL Final   HCG, QUANTITATIVE - Abnormal    HCG, Quant 5.3 (*) 0 - 5 mIU/mL Final    Narrative:      Expected Ranges:    HCG results between 5.0 and 25.0 mIU/mL may be indicative of early pregnancy but should be interpreted in light of the total clinical presentation.    HCG can rise to detectable levels in joycelyn and post menopausal women (0-11.6 mIU/mL).     Approximate               Approximate HCG  Gestation age          Concentration ( mIU/mL)  _____________          ______________________   Weeks                      HCG values  0.2-1                       5-50  1-2                           2-3                         100-5000  3-4                         500-74846  4-5                         1000-53508  5-6                         [id number]  6-8                         52218-114773  8-12                        [id number]     FERRITIN - Abnormal    Ferritin 3 (*) 11 - 307 ng/mL Final   COMPREHENSIVE METABOLIC PANEL   TIBC PANEL (INCL. IRON, TIBC, % IRON SATURATION)   IRON PANEL (INCLUDES FERRITIN,IRON SAT%, IRON, AND TIBC)    Narrative:     The following orders were created for panel order Iron Panel (Includes Ferritin, Iron Sat%, Iron, and TIBC).  Procedure                      "          Abnormality         Status                     ---------                               -----------         ------                     TIBC Panel (incl. Iron, ...[[id number]]                                                 Ferritin[[id number]]                     Abnormal            Final result                 Please view results for these tests on the individual orders.    US pelvis complete w transvaginal    (Results Pending)      Medications: Code Status:   Medications - No data to display Code Status: No Order  Advance Directive and Living Will:      Power of :    POLST:       Orders Placed This Encounter   Procedures    US pelvis complete w transvaginal    CBC and differential    Comprehensive metabolic panel    hCG, quantitative    TIBC Panel (incl. Iron, TIBC, % Iron Saturation)    Ferritin     ED Disposition       None          Follow-up Information    None       Patient's Medications    No medications on file     No discharge procedures on file.  None                        Portions of the record may have been created with voice recognition software. Occasional wrong word or \"sound a like\" substitutions may have occurred due to the inherent limitations of voice recognition software. Read the chart carefully and recognize, using context, where substitutions have occurred.    Electronically signed by:  Hiren Valdez  " Deferoxamine) may have depressed iron values.    UIBC 369 (*) 155 - 355 ug/dL Final   FERRITIN - Abnormal    Ferritin 3 (*) 11 - 307 ng/mL Final   TYPE AND SCREEN    ABO Grouping A   Final    Rh Factor Positive   Final    Antibody Screen Negative   Final    Specimen Expiration Date 20240719   Final   ABORH RECHECK    ABO Grouping A   Final    Rh Factor Positive   Final   IRON PANEL (INCLUDES FERRITIN,IRON SAT%, IRON, AND TIBC)    Narrative:     The following orders were created for panel order Iron Panel (Includes Ferritin, Iron Sat%, Iron, and TIBC).  Procedure                               Abnormality         Status                     ---------                               -----------         ------                     TIBC Panel (incl. Iron, ...[[id number]]  Abnormal            Final result               Ferritin[[id number]]                     Abnormal            Final result                 Please view results for these tests on the individual orders.    US pelvis complete w transvaginal   Final Result      Abnormality within the endometrium, with vascularity, concerning for retained products of conception.      I personally discussed this study with Diony Zapata at 7/16/24 12:08 AM            Workstation performed: [id number]            Medications: Code Status:   Medications   doxycycline (VIBRAMYCIN) 200 mg in sodium chloride 0.9 % 250 mL IVPB ( Intravenous MAR Unhold 7/16/24 0225)    Code Status: No Order  Advance Directive and Living Will:      Power of :    POLST:       Orders Placed This Encounter   Procedures    US pelvis complete w transvaginal    CBC and differential    Comprehensive metabolic panel    hCG, quantitative    TIBC Panel (incl. Iron, TIBC, % Iron Saturation)    Ferritin    Discharge Diet    Activity as tolerated    Call provider for:  severe uncontrolled pain    Call provider for:  persistent nausea or vomiting    Call provider for: active or persistent bleeding    No dressing  "needed    Type and screen    ABORh Recheck - Contact Blood Bank Prior to Collection    Prepare Leukoreduced RBC: 2 Units    Discharge Patient Home When Criteria Met    Discharge patient     Time reflects when diagnosis was documented in both MDM as applicable and the Disposition within this note       Time User Action Codes Description Comment    7/16/2024  1:28 AM Pdero Luis Oropeza Add [O03.4] Retained products of conception after miscarriage     7/16/2024  2:25 AM Milad Larios Modify [O03.4] Retained products of conception after miscarriage     7/16/2024  3:36 AM ToDiony downs Add [N93.9] Vaginal bleeding     7/16/2024  3:36 AM Tohme Diony Add [N93.9] Abnormal vaginal bleeding     7/16/2024  3:36 AM TohmDiony cano Modify [O03.4] Retained products of conception after miscarriage     7/16/2024  3:36 AM ToDiony downs Remove [N93.9] Vaginal bleeding           ED Disposition       ED Disposition   Discharge    Condition   Stable    Date/Time   Tue Jul 16, 2024  3:36 AM    Comment   Yun Mancia discharge to home/self care.                   Follow-up Information       Follow up With Specialties Details Why Contact Info Additional Information    [facility] Follow up  [address] [place] [id number] [phone] [facility], [address] [place] [address], [address] [phone]          There are no discharge medications for this patient.    Outpatient Discharge Orders   Discharge Diet     Activity as tolerated     Call provider for:  severe uncontrolled pain     Call provider for:  persistent nausea or vomiting     Call provider for: active or persistent bleeding     No dressing needed     None                        Portions of the record may have been created with voice recognition software. Occasional wrong word or \"sound a like\" substitutions may have occurred due to the " inherent limitations of voice recognition software. Read the chart carefully and recognize, using context, where substitutions have occurred.    Electronically signed by:  Hiren Valdez

## 2024-07-16 NOTE — H&P
Expand All Collapse All    H&P - Gynecology  Yun Mancia 29 y.o. female MRN: [id number]  Unit/Bed#: ED 08 Encounter: [id number]        Consults             Chief Complaint   Patient presents with    Vaginal Bleeding       Pt got an  back in may and since then has been having vaginal bleeding. Pt states she bleeds through 6 ultra tampons in a day. Pt has a hx of anemia             History of Present Illness  Physician Requesting Consult: Brandon Scott MD  Reason for Consult / Principal Problem: c/f retained POCs  Subspeciality: General GYN  HPI: Yun Mancia is a 29 y.o. year old female  who presented with bleeding for the last 2 months since a medical termination on May 17th at 6wks [place]. She notes persistent bleeding up to 6 fully soaked tampons on most days. She notes that today her bleeding is light. She has a history of chronic iron deficiency anemia and has received multiple blood transfusions in the past. Today she denies any lightheadedness, dizziness, chest pain or shortness of breath. She denies any fevers or abnormal discharge. She has not been sexually active since her medical termination in April.      OB HxL  x2, C/S x1, medical termination x1  GYNh: regular monthly heavy periods lasting up to 6 days with up to 5 or 6 tampons on heaviest day. Hx of trichomonas. Not currently sexually active.         Review of Systems           Historical Information  Medical History        Past Medical History:   Diagnosis Date    Anemia           Surgical History         Past Surgical History:   Procedure Laterality Date     SECTION       WISDOM TOOTH EXTRACTION         x4 - age 27         OB History   No obstetric history on file.      Family History         Family History   Problem Relation Age of Onset    No Known Problems Mother      No Known Problems Father      No Known Problems Sister      No Known Problems Brother      No Known Problems Brother      No Known Problems  Brother      Autism Son      ADD / ADHD Son      No Known Problems Daughter      Asthma Daughter      Anemia Maternal Grandmother      Hypertension Maternal Grandmother      Hyperlipidemia Maternal Grandmother      Diabetes Maternal Grandmother      Hypertension Paternal Grandmother      Hyperlipidemia Paternal Grandmother      Diabetes Paternal Grandmother      Anemia Maternal Aunt                 Social History  Social History           Substance and Sexual Activity   Alcohol Use Yes     Comment: socially - once every few months      Social History          Substance and Sexual Activity   Drug Use Never      Tobacco Use History   Social History          Tobacco Use   Smoking Status Never   Smokeless Tobacco Never                  Meds/Allergies  Current Medications          Current Facility-Administered Medications   Medication Dose Route Frequency    doxycycline (VIBRAMYCIN) 200 mg in sodium chloride 0.9 % 250 mL IVPB  200 mg Intravenous Once               Allergies   No Known Allergies              Objective  Vitals: Blood pressure 123/75, pulse 88, temperature 97.9 °F (36.6 °C), temperature source Temporal, resp. rate 19, SpO2 100%. There is no height or weight on file to calculate BMI.     No intake or output data in the 24 hours ending 07/16/24 0133     Invasive Devices         Peripheral Intravenous Line  Duration                Peripheral IV 07/15/24 Left Antecubital <1 day                          Physical Exam    Gen: Well appearing, no acute distress  Skin: warm and dry  CV: regular rate  Pulm: respirations non labored  Abd: Abdomen soft, non tender  : Speculum exam without any active bleeding from the cervical os. Cervical os closed. No CMT. No palpable adnexal masses and uterus non tender on BME.       Lab Results:   Recent Results         Recent Results (from the past 24 hour(s))   CBC and differential     Collection Time: 07/15/24  9:48 PM   Result Value Ref Range     WBC 5.82 4.31 - 10.16  Thousand/uL     RBC 3.15 (L) 3.81 - 5.12 Million/uL     Hemoglobin 7.6 (L) 11.5 - 15.4 g/dL     Hematocrit 25.4 (L) 34.8 - 46.1 %     MCV 81 (L) 82 - 98 fL     MCH 24.1 (L) 26.8 - 34.3 pg     MCHC 29.9 (L) 31.4 - 37.4 g/dL     RDW 16.1 (H) 11.6 - 15.1 %     MPV 8.7 (L) 8.9 - 12.7 fL     Platelets 487 (H) 149 - 390 Thousands/uL     nRBC 0 /100 WBCs     Segmented % 43 43 - 75 %     Immature Grans % 0 0 - 2 %     Lymphocytes % 46 (H) 14 - 44 %     Monocytes % 8 4 - 12 %     Eosinophils Relative 2 0 - 6 %     Basophils Relative 1 0 - 1 %     Absolute Neutrophils 2.50 1.85 - 7.62 Thousands/µL     Absolute Immature Grans 0.01 0.00 - 0.20 Thousand/uL     Absolute Lymphocytes 2.65 0.60 - 4.47 Thousands/µL     Absolute Monocytes 0.47 0.17 - 1.22 Thousand/µL     Eosinophils Absolute 0.12 0.00 - 0.61 Thousand/µL     Basophils Absolute 0.07 0.00 - 0.10 Thousands/µL   hCG, quantitative     Collection Time: 07/15/24  9:48 PM   Result Value Ref Range     HCG, Quant 5.3 (H) 0 - 5 mIU/mL   Ferritin     Collection Time: 07/15/24  9:48 PM   Result Value Ref Range     Ferritin 3 (L) 11 - 307 ng/mL   Comprehensive metabolic panel     Collection Time: 07/15/24 11:29 PM   Result Value Ref Range     Sodium 139 135 - 147 mmol/L     Potassium 3.8 3.5 - 5.3 mmol/L     Chloride 106 96 - 108 mmol/L     CO2 25 21 - 32 mmol/L     ANION GAP 8 4 - 13 mmol/L     BUN 10 5 - 25 mg/dL     Creatinine 0.61 0.60 - 1.30 mg/dL     Glucose 95 65 - 140 mg/dL     Calcium 8.8 8.4 - 10.2 mg/dL     AST 12 (L) 13 - 39 U/L     ALT 10 7 - 52 U/L     Alkaline Phosphatase 47 34 - 104 U/L     Total Protein 6.5 6.4 - 8.4 g/dL     Albumin 3.5 3.5 - 5.0 g/dL     Total Bilirubin 0.18 (L) 0.20 - 1.00 mg/dL     eGFR 123 ml/min/1.73sq m   TIBC Panel (incl. Iron, TIBC, % Iron Saturation)     Collection Time: 07/15/24 11:29 PM   Result Value Ref Range     Iron Saturation 5 (L) 15 - 50 %     TIBC 389 250 - 450 ug/dL     Iron 20 (L) 50 - 212 ug/dL     UIBC 369 (H) 155 - 355 ug/dL    Marilee Recheck - Contact Blood Bank Prior to Collection     Collection Time: 24 12:38 AM   Result Value Ref Range     ABO Grouping A       Rh Factor Positive              Imaging:  Pelvic U/S  Imaging Studies: I have personally reviewed pertinent reports.       EKG, Pathology, and Other Studies: I have personally reviewed pertinent reports.             Assessment & Plan  Assessment:  28yo  with known chronic iron deficiency anemia s/p medical termination in May presented with bleeding for the last 2 months with up to 6 tampons a day. Her labs notable for Hgb of 7.8 however her vital signs were normal and she is not actively bleeding. TVUS was notable for retained POCs which is also consistent with slightly elevated HCG. Low concern for septic  given patient is afebrile and WBC is 5.82.     I discussed expectant management, conservative vs surgical management and reviewed the risks and benefits of all three. I do not recommend expectant management at this time given that she has had these POCs for the last 2 months and is unlikely to pass the tissue on her own. I discussed the option of cytotec however given the patient's anemia I would be concerned that that significant bleeding would lead to additional blood transfusions. At this time, recommend suction D&C for the treament of retained POCs given definitive management. Risks/benefits discussed. Patient agreeable to this plan.     Plan:  NPO since 2pm  Doxycycline 200mg IV ordered  Plan to be discharged home after procedure  Will hold on blood transfusion at this time given no active bleeding.     Code Status: No Order  Advance Directive and Living Will:      Power of :    POLST:       Counseling / Coordination of Care  Total floor / unit time spent today1 hour  minutes. Greater than 50% of total time was spent with the patient and / or family counseling and / or coordination of care.     Pedro Luis Oropeza MD  2024  1:33 AM                Revision History    Date/Time User Provider Type Action   7/16/2024  1:58 AM Pedro Luis Oropeza MD Physician Addend   7/16/2024  1:44 AM Pedro Luis Oropeza MD Physician Sign    View Details Report

## 2024-07-16 NOTE — ANESTHESIA POSTPROCEDURE EVALUATION
Post-Op Assessment Note    CV Status:  Stable  Pain Score: 0    Pain management: adequate       Mental Status:  Sleepy   Hydration Status:  Stable   PONV Controlled:  None   Airway Patency:  Patent     Post Op Vitals Reviewed: Yes    No anethesia notable event occurred.    Staff: CRNA               BP   108/55   Temp   97.0   Pulse  72   Resp   15   SpO2   100

## 2024-07-16 NOTE — ED PROVIDER NOTES
History  Chief Complaint   Patient presents with    Vaginal Bleeding     Pt got an  back in may and since then has been having vaginal bleeding. Pt states she bleeds through 6 ultra tampons in a day. Pt has a hx of anemia      29-year-old female presents to the emergency department today for evaluation of vaginal bleeding which began 6 weeks after she took an oral abortive agent after she found out she was pregnant in May.  Patient does not have any pain but the bleeding has been persistent since that time.  She states that she soaked through an average of 6 tampons per day.  She tells that some days the bleeding is not terrible but other days it is fairly heavy with passage of clots occasionally.  She also tells me she has a history of anemia and low iron and has required transfusions in the past and wants those levels checked today.  She does feel that she is lightheaded at times specially with exertion and suspects it is due to the bleeding and her anemia.  Again she Clines that she has any pain or other symptoms.  No chest pain or shortness of breath.  Denies foul-smelling discharge.  No fevers or chills or other signs of infection per history.        None       Past Medical History:   Diagnosis Date    Anemia        Past Surgical History:   Procedure Laterality Date     SECTION      WISDOM TOOTH EXTRACTION      x4 - age 27       Family History   Problem Relation Age of Onset    No Known Problems Mother     No Known Problems Father     No Known Problems Sister     No Known Problems Brother     No Known Problems Brother     No Known Problems Brother     Autism Son     ADD / ADHD Son     No Known Problems Daughter     Asthma Daughter     Anemia Maternal Grandmother     Hypertension Maternal Grandmother     Hyperlipidemia Maternal Grandmother     Diabetes Maternal Grandmother     Hypertension Paternal Grandmother     Hyperlipidemia Paternal Grandmother     Diabetes Paternal Grandmother      Anemia Maternal Aunt      I have reviewed and agree with the history as documented.    E-Cigarette/Vaping    E-Cigarette Use Never User      E-Cigarette/Vaping Substances     Social History     Tobacco Use    Smoking status: Never    Smokeless tobacco: Never   Vaping Use    Vaping status: Never Used   Substance Use Topics    Alcohol use: Yes     Comment: socially - once every few months    Drug use: Never        Review of Systems   Constitutional:  Negative for activity change, appetite change, chills, fatigue and fever.   Eyes:  Negative for visual disturbance.   Respiratory:  Negative for chest tightness and shortness of breath.    Cardiovascular:  Negative for chest pain and palpitations.   Gastrointestinal:  Negative for abdominal pain, nausea and vomiting.   Genitourinary:  Positive for vaginal bleeding. Negative for difficulty urinating, dysuria, flank pain, frequency, hematuria, pelvic pain, urgency, vaginal discharge and vaginal pain.   Musculoskeletal:  Negative for back pain.   Skin:  Negative for rash.   Neurological:  Positive for light-headedness. Negative for dizziness, tremors, syncope, speech difficulty, weakness, numbness and headaches.   Psychiatric/Behavioral:  Negative for agitation and confusion.        Physical Exam  ED Triage Vitals   Temperature Pulse Respirations Blood Pressure SpO2   07/15/24 2014 07/15/24 2014 07/15/24 2014 07/15/24 2014 07/15/24 2014   97.9 °F (36.6 °C) 83 20 (!) 140/106 100 %      Temp Source Heart Rate Source Patient Position - Orthostatic VS BP Location FiO2 (%)   07/15/24 2014 07/15/24 2014 07/16/24 0030 07/15/24 2014 --   Temporal Monitor Lying Left arm       Pain Score       07/16/24 0202       No Pain             Orthostatic Vital Signs  Vitals:    07/15/24 2014 07/16/24 0030 07/16/24 0252 07/16/24 0300   BP: (!) 140/106 123/75 108/55    Pulse: 83 88 85 80   Patient Position - Orthostatic VS:  Lying         Physical Exam  Constitutional:       General: She is not  in acute distress.     Appearance: Normal appearance.   HENT:      Head: Normocephalic and atraumatic.      Mouth/Throat:      Mouth: Mucous membranes are moist.   Eyes:      Pupils: Pupils are equal, round, and reactive to light.   Cardiovascular:      Rate and Rhythm: Normal rate and regular rhythm.      Pulses: Normal pulses.      Heart sounds: Normal heart sounds. No murmur heard.  Pulmonary:      Effort: Pulmonary effort is normal. No respiratory distress.      Breath sounds: Normal breath sounds.   Abdominal:      General: There is no distension.      Palpations: Abdomen is soft.      Tenderness: There is no abdominal tenderness. There is no right CVA tenderness, left CVA tenderness or guarding.   Genitourinary:     General: Normal vulva.      Vagina: No vaginal discharge.      Comments: Small amount of active hemorrhage noted from the cervical os on speculum examination which was performed with the chaperone in the room.  Musculoskeletal:      Right lower leg: No edema.      Left lower leg: No edema.   Skin:     General: Skin is warm and dry.      Coloration: Skin is not jaundiced.      Findings: No erythema or rash.   Neurological:      General: No focal deficit present.      Mental Status: She is alert and oriented to person, place, and time.   Psychiatric:         Mood and Affect: Mood normal.         Behavior: Behavior normal.         ED Medications  Medications   fentaNYL (SUBLIMAZE) injection 25 mcg (has no administration in time range)   HYDROmorphone (DILAUDID) injection 0.5 mg (has no administration in time range)   ondansetron (ZOFRAN) injection 4 mg (has no administration in time range)   promethazine (PHENERGAN) injection 12.5 mg (has no administration in time range)   doxycycline (VIBRAMYCIN) 200 mg in sodium chloride 0.9 % 250 mL IVPB ( Intravenous MAR Unhold 7/16/24 0225)       Diagnostic Studies  Results Reviewed       Procedure Component Value Units Date/Time    Comprehensive metabolic panel  [[id number]]  (Abnormal) Collected: 07/15/24 2329    Lab Status: Final result Specimen: Blood from Arm, Left Updated: 07/15/24 2358     Sodium 139 mmol/L      Potassium 3.8 mmol/L      Chloride 106 mmol/L      CO2 25 mmol/L      ANION GAP 8 mmol/L      BUN 10 mg/dL      Creatinine 0.61 mg/dL      Glucose 95 mg/dL      Calcium 8.8 mg/dL      AST 12 U/L      ALT 10 U/L      Alkaline Phosphatase 47 U/L      Total Protein 6.5 g/dL      Albumin 3.5 g/dL      Total Bilirubin 0.18 mg/dL      eGFR 123 ml/min/1.73sq m     Narrative:      National Kidney [employer] Foundation guidelines for Chronic Kidney Disease (CKD):     Stage 1 with normal or high GFR (GFR > 90 mL/min/1.73 square meters)    Stage 2 Mild CKD (GFR = 60-89 mL/min/1.73 square meters)    Stage 3A Moderate CKD (GFR = 45-59 mL/min/1.73 square meters)    Stage 3B Moderate CKD (GFR = 30-44 mL/min/1.73 square meters)    Stage 4 Severe CKD (GFR = 15-29 mL/min/1.73 square meters)    Stage 5 End Stage CKD (GFR <15 mL/min/1.73 square meters)  Note: GFR calculation is accurate only with a steady state creatinine    TIBC Panel (incl. Iron, TIBC, % Iron Saturation) [[id number]]  (Abnormal) Collected: 07/15/24 2329    Lab Status: Final result Specimen: Blood from Arm, Left Updated: 07/15/24 2358     Iron Saturation 5 %      TIBC 389 ug/dL      Iron 20 ug/dL      UIBC 369 ug/dL     hCG, quantitative [[id number]]  (Abnormal) Collected: 07/15/24 2148    Lab Status: Final result Specimen: Blood from Arm, Left Updated: 07/15/24 2243     HCG, Quant 5.3 mIU/mL     Narrative:       Expected Ranges:    HCG results between 5.0 and 25.0 mIU/mL may be indicative of early pregnancy but should be interpreted in light of the total clinical presentation.    HCG can rise to detectable levels in joycelyn and post menopausal women (0-11.6 mIU/mL).     Approximate               Approximate HCG  Gestation age          Concentration ( mIU/mL)  _____________          ______________________   Weeks                       HCG values  0.2-1                       5-50  1-2                           2-3                         100-5000  3-4                         500-88583  4-5                         1000-48927  5-6                         [id number]  6-8                         25109-264524  8-12                        57009-580028      Ferritin [[id number]]  (Abnormal) Collected: 07/15/24 2148    Lab Status: Final result Specimen: Blood from Arm, Left Updated: 07/15/24 2243     Ferritin 3 ng/mL     CBC and differential [[id number]]  (Abnormal) Collected: 07/15/24 2148    Lab Status: Final result Specimen: Blood from Arm, Left Updated: 07/15/24 2157     WBC 5.82 Thousand/uL      RBC 3.15 Million/uL      Hemoglobin 7.6 g/dL      Hematocrit 25.4 %      MCV 81 fL      MCH 24.1 pg      MCHC 29.9 g/dL      RDW 16.1 %      MPV 8.7 fL      Platelets 487 Thousands/uL      nRBC 0 /100 WBCs      Segmented % 43 %      Immature Grans % 0 %      Lymphocytes % 46 %      Monocytes % 8 %      Eosinophils Relative 2 %      Basophils Relative 1 %      Absolute Neutrophils 2.50 Thousands/µL      Absolute Immature Grans 0.01 Thousand/uL      Absolute Lymphocytes 2.65 Thousands/µL      Absolute Monocytes 0.47 Thousand/µL      Eosinophils Absolute 0.12 Thousand/µL      Basophils Absolute 0.07 Thousands/µL                    US pelvis complete w transvaginal   Final Result by Pablito Gunn MD (07/16 0009)      Abnormality within the endometrium, with vascularity, concerning for retained products of conception.      I personally discussed this study with Diony Zapata at 7/16/24 12:08 AM            Workstation performed: [id number]               Procedures  Procedures      ED Course                                       Medical Decision Making  21-year-old female presents to the emergency department today for evaluation of persistent vaginal bleeding for the past 6 weeks.  Speculum exam reveals small amount of active hemorrhage from the  cervical os.  No abdominal tenderness on examination.  Lab work included CBC CMP iron panel hCG quantitative and type and screen after hemoglobin was found to be 7.6 in the setting of active hemorrhage.  Labs not indicative of infection and vitals were within normal limits and patient was hemodynamically stable.  Based on her history, my concern is retained products of conception after pharmacologic  in May 2024.  Formal ultrasound was obtained and revealed findings consistent with this suspicion.  I personally discussed the results with the radiologist.  Gynecology was consulted and patient was taken for D&C.    Amount and/or Complexity of Data Reviewed  Labs: ordered.  Radiology: ordered.          Disposition  Final diagnoses:   Retained products of conception after miscarriage     Time reflects when diagnosis was documented in both MDM as applicable and the Disposition within this note       Time User Action Codes Description Comment    2024  1:28 AM Pedro Luis Oropeza Add [O03.4] Retained products of conception after miscarriage     2024  2:25 AM Milad Larios Modify [O03.4] Retained products of conception after miscarriage           ED Disposition       None          Follow-up Information       Follow up With Specialties Details Why Contact Info Additional Information    [facility] Obstetrics and Gynecology Follow up  [address] [place] [address] [phone] [facility], [address] [place] [address], [facility] [address], [place], [address] [phone]            There are no discharge medications for this patient.    Outpatient Discharge Orders   Discharge Diet     Activity as tolerated     Call provider for:  severe uncontrolled pain     Call provider for:  persistent nausea or vomiting     Call provider for: active or persistent bleeding     No dressing needed       PDMP Review       None             ED  Provider  Attending physically available and evaluated Yun Mancia. I managed the patient along with the ED Attending.    Electronically Signed by           Diony Zapata MD  07/16/24 1641

## 2024-07-16 NOTE — OP NOTE
OPERATIVE REPORT  PATIENT NAME: Yun Mancia    :  1995  MRN: [id number]  Pt Location:  OR ROOM 09    SURGERY DATE: 2024    Surgeons and Role:     * Pedro Luis Oropeza MD - Primary    Preop Diagnosis:  Retained products of conception after miscarriage [O03.4]    Post-Op Diagnosis Codes:     * Retained products of conception after miscarriage [O03.4]    Procedure(s):  DILATATION AND CURETTAGE (D&C) SUCTION PRODUCT OF CONCEPTION    Specimen(s):  ID Type Source Tests Collected by Time Destination   1 : gestational weeks= unknown  in may Tissue Products of Conception TISSUE EXAM Pedro Luis Oropeza MD 2024 0224        Estimated Blood Loss:   Minimal    Drains:  * No LDAs found *    Anesthesia Type:   General    Operative Indications:  Retained products of conception after miscarriage [O03.4]      Operative Findings:  Normal external genitalia   8wk anteverted uterus without any adnexal masses appreciated on BME      Complications:   None    Procedure and Technique:  Patient was taken to the operating room were a time out was performed to confirm correct patient and correct procedure. General LMA anesthesia (LMA) was administered and the patient was positioned on the OR table in the dorsal lithotomy position. All pressure points were padded and a andrew hugger was placed to maintain control of core body temperature. A bimanual exam was performed and the uterus was noted to be anteverted, 8wks in size and consistency with no palpable adnexal masses or fullness. The patient was prepped and draped in the usual sterile fashion.    A straight catheter was introduced into the bladder, which was drained of 25cc of clear yellow urine. A weighted speculum was inserted into the vagina and a Rowley retractor was used to visualize the anterior lip of the cervix, which was then grasped with a single toothed tenaculum. The cervix was serially dilated to accommodate an 8 straight rigid suction curette.     The suction  curette was introduced into the uterine cavity to the fundus. Suction was applied and curetting was performed by rotating curette 360 degrees as it was withdrawn from the uterus. Suction was released prior to reaching the cervical os. This was performed until no tissue was noted. Following suction curettage, sharp curetting was gently performed starting at the 12'oclock position and rotating a total of 360 degrees to cover all surfaces. Products of conception were obtained and sent for pathology.     The single toothed tenaculum was removed from the anterior lip of the cervix. Good hemostasis was confirmed at the tenaculum puncture sites. Weighted speculum was then removed from the vagina.    At the conclusion of the procedure, all needle, sponge, and instrument counts were noted to be correct x2. Patient tolerated the procedure well and was transferred to PACU in stable condition.    I was present for the entire procedure.    Patient Disposition:  PACU         SIGNATURE: Pedro Luis Oropeza MD  DATE: July 16, 2024  TIME: 2:57 AM

## 2024-07-16 NOTE — ED ATTENDING ATTESTATION
Consultation - Gynecology  Yun Mancia 29 y.o. female MRN: [id number]  Unit/Bed#: ED 08 Encounter: [id number]      Consults      Chief Complaint   Patient presents with    Vaginal Bleeding     Pt got an  back in may and since then has been having vaginal bleeding. Pt states she bleeds through 6 ultra tampons in a day. Pt has a hx of anemia        History of Present Illness   Physician Requesting Consult: Brandon Scott MD  Reason for Consult / Principal Problem: c/f retained POCs  Subspeciality: General GYN  HPI: Yun Mancia is a 29 y.o. year old female  who presented with bleeding for the last 2 months since a medical termination on May 17th at 6wks GA. She notes persistent bleeding up to 6 fully soaked tampons on most days. She notes that today her bleeding is light. She has a history of chronic iron deficiency anemia and has received multiple blood transfusions in the past. Today she denies any lightheadedness, dizziness, chest pain or shortness of breath. She denies any fevers or abnormal discharge. She has not been sexually active since her medical termination in April.     OB HxL  x2, C/S x1, medical termination x1  GYNh: regular monthly heavy periods lasting up to 6 days with up to 5 or 6 tampons on heaviest day. Hx of trichomonas. Not currently sexually active.       Review of Systems    Historical Information   Past Medical History:   Diagnosis Date    Anemia      Past Surgical History:   Procedure Laterality Date     SECTION      WISDOM TOOTH EXTRACTION      x4 - age 27     OB History   No obstetric history on file.     Family History   Problem Relation Age of Onset    No Known Problems Mother     No Known Problems Father     No Known Problems Sister     No Known Problems Brother     No Known Problems Brother     No Known Problems Brother     Autism Son     ADD / ADHD Son     No Known Problems Daughter     Asthma Daughter     Anemia Maternal Grandmother      Hypertension Maternal Grandmother     Hyperlipidemia Maternal Grandmother     Diabetes Maternal Grandmother     Hypertension Paternal Grandmother     Hyperlipidemia Paternal Grandmother     Diabetes Paternal Grandmother     Anemia Maternal Aunt      Social History   Social History     Substance and Sexual Activity   Alcohol Use Yes    Comment: socially - once every few months     Social History     Substance and Sexual Activity   Drug Use Never     Social History     Tobacco Use   Smoking Status Never   Smokeless Tobacco Never       Meds/Allergies   Current Facility-Administered Medications   Medication Dose Route Frequency    doxycycline (VIBRAMYCIN) 200 mg in sodium chloride 0.9 % 250 mL IVPB  200 mg Intravenous Once         No Known Allergies    Objective   Vitals: Blood pressure 123/75, pulse 88, temperature 97.9 °F (36.6 °C), temperature source Temporal, resp. rate 19, SpO2 100%. There is no height or weight on file to calculate BMI.    No intake or output data in the 24 hours ending 07/16/24 0133    Invasive Devices       Peripheral Intravenous Line  Duration             Peripheral IV 07/15/24 Left Antecubital <1 day                    Physical Exam    Lab Results:   Recent Results (from the past 24 hour(s))   CBC and differential    Collection Time: 07/15/24  9:48 PM   Result Value Ref Range    WBC 5.82 4.31 - 10.16 Thousand/uL    RBC 3.15 (L) 3.81 - 5.12 Million/uL    Hemoglobin 7.6 (L) 11.5 - 15.4 g/dL    Hematocrit 25.4 (L) 34.8 - 46.1 %    MCV 81 (L) 82 - 98 fL    MCH 24.1 (L) 26.8 - 34.3 pg    MCHC 29.9 (L) 31.4 - 37.4 g/dL    RDW 16.1 (H) 11.6 - 15.1 %    MPV 8.7 (L) 8.9 - 12.7 fL    Platelets 487 (H) 149 - 390 Thousands/uL    nRBC 0 /100 WBCs    Segmented % 43 43 - 75 %    Immature Grans % 0 0 - 2 %    Lymphocytes % 46 (H) 14 - 44 %    Monocytes % 8 4 - 12 %    Eosinophils Relative 2 0 - 6 %    Basophils Relative 1 0 - 1 %    Absolute Neutrophils 2.50 1.85 - 7.62 Thousands/µL    Absolute Immature Grans  0.01 0.00 - 0.20 Thousand/uL    Absolute Lymphocytes 2.65 0.60 - 4.47 Thousands/µL    Absolute Monocytes 0.47 0.17 - 1.22 Thousand/µL    Eosinophils Absolute 0.12 0.00 - 0.61 Thousand/µL    Basophils Absolute 0.07 0.00 - 0.10 Thousands/µL   hCG, quantitative    Collection Time: 07/15/24  9:48 PM   Result Value Ref Range    HCG, Quant 5.3 (H) 0 - 5 mIU/mL   Ferritin    Collection Time: 07/15/24  9:48 PM   Result Value Ref Range    Ferritin 3 (L) 11 - 307 ng/mL   Comprehensive metabolic panel    Collection Time: 07/15/24 11:29 PM   Result Value Ref Range    Sodium 139 135 - 147 mmol/L    Potassium 3.8 3.5 - 5.3 mmol/L    Chloride 106 96 - 108 mmol/L    CO2 25 21 - 32 mmol/L    ANION GAP 8 4 - 13 mmol/L    BUN 10 5 - 25 mg/dL    Creatinine 0.61 0.60 - 1.30 mg/dL    Glucose 95 65 - 140 mg/dL    Calcium 8.8 8.4 - 10.2 mg/dL    AST 12 (L) 13 - 39 U/L    ALT 10 7 - 52 U/L    Alkaline Phosphatase 47 34 - 104 U/L    Total Protein 6.5 6.4 - 8.4 g/dL    Albumin 3.5 3.5 - 5.0 g/dL    Total Bilirubin 0.18 (L) 0.20 - 1.00 mg/dL    eGFR 123 ml/min/1.73sq m   TIBC Panel (incl. Iron, TIBC, % Iron Saturation)    Collection Time: 07/15/24 11:29 PM   Result Value Ref Range    Iron Saturation 5 (L) 15 - 50 %    TIBC 389 250 - 450 ug/dL    Iron 20 (L) 50 - 212 ug/dL    UIBC 369 (H) 155 - 355 ug/dL   ABORh Recheck - Contact Blood Bank Prior to Collection    Collection Time: 24 12:38 AM   Result Value Ref Range    ABO Grouping A     Rh Factor Positive        Imaging:  Pelvic U/S  Imaging Studies: I have personally reviewed pertinent reports.      EKG, Pathology, and Other Studies: I have personally reviewed pertinent reports.      Assessment & Plan     Assessment:  30yo  with known chronic iron deficiency anemia s/p medical termination in May presented with bleeding for the last 2 months with up to 6 tampons a day. Her labs notable for Hgb of 7.8 however her vital signs were normal and she is not actively bleeding. TVUS was  notable for retained POCs which is also consistent with slightly elevated HCG. Low concern for septic  given patient is afebrile and WBC is 5.82.    I discussed expectant management, conservative vs surgical management and reviewed the risks and benefits of all three. I do not recommend expectant management at this time given that she has had these POCs for the last 2 months and is unlikely to pass the tissue on her own. I discussed the option of cytotec however given the patient's anemia I would be concerned that that significant bleeding would lead to additional blood transfusions. At this time, recommend suction D&C for the treament of retained POCs given definitive management. Risks/benefits discussed. Patient agreeable to this plan.    Plan:  NPO since 2pm  Doxycycline 200mg IV ordered  Plan to be discharged home after procedure  Will hold on blood transfusion at this time given no active bleeding.    Code Status: No Order  Advance Directive and Living Will:      Power of :    POLST:      Counseling / Coordination of Care  Total floor / unit time spent today1 hour  minutes. Greater than 50% of total time was spent with the patient and / or family counseling and / or coordination of care.    Pedro Luis Oropeza MD  2024  1:33 AM

## 2024-07-16 NOTE — ED NOTES
Resumed care from OR. VSS. Patient resting in bed. Aware of discharge and discharge instructions reviewed with follow up. States that she has an aunt that is the waiting room to be able to take her home.      Lalitha Jules RN  07/16/24 0357

## 2024-07-16 NOTE — ED NOTES
Patient transported to OR    Gm Worthington RN  07/16/24 0150       Gm Worthington RN  07/16/24 0151

## 2024-07-17 ENCOUNTER — TELEPHONE (OUTPATIENT)
Dept: FAMILY MEDICINE CLINIC | Facility: CLINIC | Age: 29
End: 2024-07-17

## 2024-07-17 ENCOUNTER — TELEPHONE (OUTPATIENT)
Age: 29
End: 2024-07-17

## 2024-07-17 LAB
ABO GROUP BLD BPU: NORMAL
ABO GROUP BLD BPU: NORMAL
BPU ID: NORMAL
BPU ID: NORMAL
CROSSMATCH: NORMAL
CROSSMATCH: NORMAL
UNIT DISPENSE STATUS: NORMAL
UNIT DISPENSE STATUS: NORMAL
UNIT PRODUCT CODE: NORMAL
UNIT PRODUCT CODE: NORMAL
UNIT PRODUCT VOLUME: 350 ML
UNIT PRODUCT VOLUME: 350 ML
UNIT RH: NORMAL
UNIT RH: NORMAL

## 2024-07-17 NOTE — TELEPHONE ENCOUNTER
Patient returning phone call from referral, to schedule follow up appt for bleeding, patient called in on 07/15/2024 and was sent to the ED. Patient calling to schedule follow up appt, with [facility], soonest appt I am seeing is 08/12/2024, I warm transferred and spoke with Christine, who took over the call.

## 2024-07-18 ENCOUNTER — TELEPHONE (OUTPATIENT)
Dept: OTHER | Facility: HOSPITAL | Age: 29
End: 2024-07-18

## 2024-07-18 PROCEDURE — 88305 TISSUE EXAM BY PATHOLOGIST: CPT | Performed by: STUDENT IN AN ORGANIZED HEALTH CARE EDUCATION/TRAINING PROGRAM

## 2024-07-18 NOTE — TELEPHONE ENCOUNTER
Attempted to reach patient this afternoon to check in s/p suction D&C. There was no answer and no option to leave VM.

## 2024-07-19 NOTE — TELEPHONE ENCOUNTER
Relayed results to patient as per provider message. Patient expressed understanding and did not have any further questions.               Patient advised she did end up going to the ED and is now feeling much better.

## 2024-08-04 PROBLEM — R35.0 URINARY FREQUENCY: Status: ACTIVE | Noted: 2024-08-04

## 2024-08-04 PROBLEM — Z33.2 ABORTION IN FIRST TRIMESTER: Status: ACTIVE | Noted: 2024-08-04

## 2024-08-04 PROBLEM — R53.83 FATIGUE: Status: ACTIVE | Noted: 2024-08-04

## 2024-08-04 NOTE — ASSESSMENT & PLAN NOTE
Reports having an  via taking a pill.  She reports having 2 heavy menses since then.  Concern for possible complications.  Referred to OB/GYN.  Check hCG.

## 2024-09-12 NOTE — PROGRESS NOTES
Hematology/Oncology Outpatient Consult Note  Yun Mancia 29 y.o. female MRN: @ Encounter: [id number]        Date:  9/13/2024        CC:  Iron deficiency anemia       HPI:  Yun Mancia is a 29 year old female who is referred to hematology for iron deficiency anemia.  She is being seen for initial consultation 9/13/2024     Patient is originally from [facility].  Previously lived in [place] for many years, relocated to this area back in March.  She reports longstanding history of iron deficiency anemia.  She does have significant menorrhagia.  Patient reports she experiences monthly menses, menstrual cycle occurs regularly and lasts approximately 5 to 6 days.  She bleeds heavily for several days and requires both tampon and a pad simultaneously, needing to change every 1-2 hours.  Last menstrual period ended a few days ago    She has 3 children ages 3 6 and 9.  States she had uncomplicated pregnancies and delivery.  Never experienced any postpartum hemorrhage or bleeding complications.  Outside of heavy menstrual cycles, no other abnormal bleeding issues    Her symptomatic anemia dates back to at least 2018.  She has required blood transfusions and states she did receive iron infusions while living in [place] years ago.    Most recent blood work on file was completed on 7/15/2024.  She had evidence of severe iron deficiency, microcytic hyperchromic anemia and reactive thrombocytosis.  Hemoglobin 7.6, MCV 81.  Serum ferritin 3  No recent blood work.    She has been taking oral iron supplements and vitamins.  She reports symptoms of significant fatigue, headaches, occasional lightheadedness, exertional dyspnea.  She has significant pica which is associated with strong ice cravings, craving for mode and bar soap    She was seen in the ED in 7/2024 for vaginal bleeding for 6 weeks straight.  Pelvic ultrasound showed retained products of conception.  She did undergo D&C on 7/16/2024    She has an appointment in November  to establish care with gynecology to discuss menorrhagia management.    Test Results:    Imaging: No results found.    Labs:   Lab Results   Component Value Date    WBC 5.67 2024    HGB 8.5 (L) 2024    HCT 29.5 (L) 2024    MCV 78 (L) 2024     (H) 2024     Lab Results   Component Value Date    K 4.0 2024     2024    CO2 26 2024    BUN 8 2024    CREATININE 0.64 2024    GLUF 106 (H) 2024    CALCIUM 8.6 2024    AST 12 (L) 2024    ALT 12 2024    ALKPHOS 54 2024    EGFR 121 2024             ROS:  Review of Systems   Constitutional:  Positive for fatigue.   Respiratory:  Positive for shortness of breath.    Genitourinary:  Positive for menstrual problem.   Neurological:  Positive for light-headedness and headaches.   All other systems reviewed and are negative.          Active Problems:   Patient Active Problem List   Diagnosis    Iron deficiency anemia    Retained products of conception after miscarriage    Urinary frequency     in first trimester    Fatigue       Past Medical History:   Past Medical History:   Diagnosis Date    Anemia        Surgical History:   Past Surgical History:   Procedure Laterality Date     SECTION      DILATION AND CURETTAGE OF UTERUS N/A 2024    Procedure: DILATATION AND CURETTAGE (D&C) SUCTION PRODUCT OF CONCEPTION;  Surgeon: Pedro Luis Oropeza MD;  Location: [facility];  Service: Gynecology    WISDOM TOOTH EXTRACTION      x4 - age 27       Family History:    Family History   Problem Relation Age of Onset    No Known Problems Mother     No Known Problems Father     No Known Problems Sister     No Known Problems Brother     No Known Problems Brother     No Known Problems Brother     Autism Son     ADD / ADHD Son     No Known Problems Daughter     Asthma Daughter     Anemia Maternal Grandmother     Hypertension Maternal Grandmother     Hyperlipidemia Maternal  Grandmother     Diabetes Maternal Grandmother     Hypertension Paternal Grandmother     Hyperlipidemia Paternal Grandmother     Diabetes Paternal Grandmother     Anemia Maternal Aunt        Cancer-related family history is not on file.    Social History:   Social History     Socioeconomic History    Marital status: Single     Spouse name: Not on file    Number of children: Not on file    Years of education: Not on file    Highest education level: Not on file   Occupational History    Not on file   Tobacco Use    Smoking status: Never    Smokeless tobacco: Never   Vaping Use    Vaping status: Never Used   Substance and Sexual Activity    Alcohol use: Yes     Comment: socially - once every few months    Drug use: Never    Sexual activity: Yes     Partners: Male   Other Topics Concern    Not on file   Social History Narrative    Not on file     Social Determinants of Health     Financial Resource Strain: Low Risk  (7/13/2022)    Received from [employer]    Overall Financial Resource Strain (CARDIA)     Difficulty of Paying Living Expenses: Not hard at all   Food Insecurity: No Food Insecurity (7/13/2022)    Received from [employer]    Food Insecurity     Within the past 12 months, you worried that your food would run out before you got the money to buy more.: 1     Within the past 12 months, the food you bought just didn't last and you didn't have money to get more.: 1   Transportation Needs: No Transportation Needs (7/14/2022)    Received from [employer]    Transportation Needs     In the past 12 months, has lack of transportation kept you from medical appointments or from getting medications?: 2     In the past 12 months, has lack of transportation kept you from meetings, work, or from getting things needed for daily living?: 2   Physical Activity: Insufficiently Active (7/13/2022)    Received from [employer]    Physical Activity     On average, how many days per week do you engage in moderate to strenuous  exercise (like a brisk walk)?: 1 day     On average, how many minutes do you engage in exercise at this level?: 30 min   Stress: Stress Concern Present (7/13/2022)    Received from [facility]    [employer] - Occupational Stress Questionnaire     Feeling of Stress : Rather much   Social Connections: Socially Isolated (7/13/2022)    Received from [facility]    Social Connection and Isolation Panel [NHANES]     Frequency of Communication with Friends and Family: Never     Frequency of Social Gatherings with Friends and Family: Never     Attends Christian Services: Never     Active Member of Clubs or Organizations: No     Attends Club or Organization Meetings: Never     Marital Status:    Intimate Partner Violence: Not At Risk (8/16/2023)    Received from [facility]     Threatened: Not on file     Insulted: Not on file     Physically Hurt : Not on file     Scream: Not on file   Housing Stability: At Risk (8/16/2023)    Received from [facility]     Living Situation: Not on file     Housing Problems: Not on file       Current Medications:   No current outpatient medications on file.     No current facility-administered medications for this visit.       Allergies: No Known Allergies      Physical Exam:    Body surface area is 1.99 meters squared.    Wt Readings from Last 3 Encounters:   09/13/24 89.5 kg (197 lb 6.4 oz)   07/12/24 89.2 kg (196 lb 9.6 oz)        Temp Readings from Last 3 Encounters:   09/13/24 98.4 °F (36.9 °C) (Temporal)   07/16/24 (!) 97 °F (36.1 °C)   07/12/24 (!) 97.2 °F (36.2 °C) (Tympanic)        BP Readings from Last 3 Encounters:   09/13/24 132/89   07/16/24 119/75   07/12/24 120/80         Pulse Readings from Last 3 Encounters:   09/13/24 72   07/16/24 78   07/12/24 79          Physical Exam  Constitutional:       General: She is not in acute distress.     Appearance: Normal appearance.   HENT:      Head: Normocephalic and  atraumatic.   Eyes:      General: No scleral icterus.        Right eye: No discharge.         Left eye: No discharge.      Conjunctiva/sclera: Conjunctivae normal.   Cardiovascular:      Rate and Rhythm: Normal rate and regular rhythm.   Pulmonary:      Effort: Pulmonary effort is normal. No respiratory distress.      Breath sounds: Normal breath sounds.   Abdominal:      General: Bowel sounds are normal. There is no distension.      Palpations: Abdomen is soft. There is no mass.      Tenderness: There is no abdominal tenderness.   Musculoskeletal:         General: Normal range of motion.   Lymphadenopathy:      Cervical: No cervical adenopathy.      Upper Body:      Right upper body: No supraclavicular, axillary or pectoral adenopathy.      Left upper body: No supraclavicular, axillary or pectoral adenopathy.   Skin:     General: Skin is warm and dry.   Neurological:      General: No focal deficit present.      Mental Status: She is alert and oriented to person, place, and time.   Psychiatric:         Mood and Affect: Mood normal.         Behavior: Behavior normal.              Assessment/ Plan:    1. Iron deficiency anemia due to chronic blood loss      Patient is a very pleasant 29-year-old female with longstanding history of symptomatic anemia requiring blood transfusions in the past, severe iron deficiency likely in the setting of significant menorrhagia.  Most recent blood work is consistent with severe iron deficiency anemia.  She is very symptomatic and would benefit from parenteral iron replacement.  We did discuss IV iron replacement.  Potential side effects of IV iron could include but may not be limited to:  change in taste, diarrhea, muscle cramps, nausea or vomiting, pain in the arms or legs, pain or burning sensation in the injection site, allergic reaction.  The patient verbalized understanding and wishes to proceed.    I have requested updated labs today and have ordered IV Venofer 300 mg x 4 doses.  " I will also give her injectable B12 weekly with her iron infusions.    Patient is in agreement with this plan of care.  I will see her back in 4 months with repeat labs to assess how she is feeling and how she responded to iron infusions.  If she continues to have significant menorrhagia, she would likely benefit from maintenance iron infusions.  Will discuss at her follow-up visit.  Instructed to call anytime with questions or concerns    Portions of the record may have been created with voice recognition software.  Occasional wrong word or \"sound a like\" substitutions may have occurred due to the inherent limitations of voice recognition software.  Read the chart carefully and recognize, using context, where substitutions have occurred.          "

## 2024-09-13 ENCOUNTER — OFFICE VISIT (OUTPATIENT)
Age: 29
End: 2024-09-13
Payer: COMMERCIAL

## 2024-09-13 ENCOUNTER — TELEPHONE (OUTPATIENT)
Age: 29
End: 2024-09-13

## 2024-09-13 ENCOUNTER — APPOINTMENT (OUTPATIENT)
Dept: LAB | Facility: HOSPITAL | Age: 29
End: 2024-09-13
Payer: COMMERCIAL

## 2024-09-13 VITALS
HEART RATE: 72 BPM | SYSTOLIC BLOOD PRESSURE: 132 MMHG | WEIGHT: 197.4 LBS | HEIGHT: 66 IN | BODY MASS INDEX: 31.72 KG/M2 | RESPIRATION RATE: 18 BRPM | DIASTOLIC BLOOD PRESSURE: 89 MMHG | OXYGEN SATURATION: 100 % | TEMPERATURE: 98.4 F

## 2024-09-13 DIAGNOSIS — R53.83 FATIGUE, UNSPECIFIED TYPE: ICD-10-CM

## 2024-09-13 DIAGNOSIS — D50.0 IRON DEFICIENCY ANEMIA DUE TO CHRONIC BLOOD LOSS: Primary | ICD-10-CM

## 2024-09-13 DIAGNOSIS — Z13.220 LIPID SCREENING: ICD-10-CM

## 2024-09-13 DIAGNOSIS — D50.0 IRON DEFICIENCY ANEMIA DUE TO CHRONIC BLOOD LOSS: ICD-10-CM

## 2024-09-13 DIAGNOSIS — D50.9 IRON DEFICIENCY ANEMIA, UNSPECIFIED IRON DEFICIENCY ANEMIA TYPE: ICD-10-CM

## 2024-09-13 DIAGNOSIS — Z13.1 DIABETES MELLITUS SCREENING: ICD-10-CM

## 2024-09-13 DIAGNOSIS — N92.1 MENORRHAGIA WITH IRREGULAR CYCLE: ICD-10-CM

## 2024-09-13 LAB
ALBUMIN SERPL BCG-MCNC: 3.6 G/DL (ref 3.5–5)
ALP SERPL-CCNC: 54 U/L (ref 34–104)
ALT SERPL W P-5'-P-CCNC: 12 U/L (ref 7–52)
ANION GAP SERPL CALCULATED.3IONS-SCNC: 7 MMOL/L (ref 4–13)
AST SERPL W P-5'-P-CCNC: 12 U/L (ref 13–39)
B-HCG SERPL-ACNC: 0.6 MIU/ML (ref 0–5)
BASOPHILS # BLD AUTO: 0.08 THOUSANDS/ΜL (ref 0–0.1)
BASOPHILS NFR BLD AUTO: 1 % (ref 0–1)
BILIRUB SERPL-MCNC: 0.19 MG/DL (ref 0.2–1)
BUN SERPL-MCNC: 8 MG/DL (ref 5–25)
CALCIUM SERPL-MCNC: 8.6 MG/DL (ref 8.4–10.2)
CHLORIDE SERPL-SCNC: 107 MMOL/L (ref 96–108)
CHOLEST SERPL-MCNC: 138 MG/DL
CO2 SERPL-SCNC: 26 MMOL/L (ref 21–32)
CREAT SERPL-MCNC: 0.64 MG/DL (ref 0.6–1.3)
EOSINOPHIL # BLD AUTO: 0.16 THOUSAND/ΜL (ref 0–0.61)
EOSINOPHIL NFR BLD AUTO: 3 % (ref 0–6)
ERYTHROCYTE [DISTWIDTH] IN BLOOD BY AUTOMATED COUNT: 16.5 % (ref 11.6–15.1)
FERRITIN SERPL-MCNC: 5 NG/ML (ref 11–307)
GFR SERPL CREATININE-BSD FRML MDRD: 121 ML/MIN/1.73SQ M
GLUCOSE P FAST SERPL-MCNC: 106 MG/DL (ref 65–99)
HCT VFR BLD AUTO: 29.5 % (ref 34.8–46.1)
HDLC SERPL-MCNC: 65 MG/DL
HGB BLD-MCNC: 8.5 G/DL (ref 11.5–15.4)
IMM GRANULOCYTES # BLD AUTO: 0.02 THOUSAND/UL (ref 0–0.2)
IMM GRANULOCYTES NFR BLD AUTO: 0 % (ref 0–2)
IRON SATN MFR SERPL: 54 % (ref 15–50)
IRON SERPL-MCNC: 204 UG/DL (ref 50–212)
LDLC SERPL CALC-MCNC: 59 MG/DL (ref 0–100)
LYMPHOCYTES # BLD AUTO: 2.19 THOUSANDS/ΜL (ref 0.6–4.47)
LYMPHOCYTES NFR BLD AUTO: 39 % (ref 14–44)
MCH RBC QN AUTO: 22.4 PG (ref 26.8–34.3)
MCHC RBC AUTO-ENTMCNC: 28.8 G/DL (ref 31.4–37.4)
MCV RBC AUTO: 78 FL (ref 82–98)
MONOCYTES # BLD AUTO: 0.46 THOUSAND/ΜL (ref 0.17–1.22)
MONOCYTES NFR BLD AUTO: 8 % (ref 4–12)
NEUTROPHILS # BLD AUTO: 2.76 THOUSANDS/ΜL (ref 1.85–7.62)
NEUTS SEG NFR BLD AUTO: 49 % (ref 43–75)
NRBC BLD AUTO-RTO: 0 /100 WBCS
PLATELET # BLD AUTO: 480 THOUSANDS/UL (ref 149–390)
PMV BLD AUTO: 8.6 FL (ref 8.9–12.7)
POTASSIUM SERPL-SCNC: 4 MMOL/L (ref 3.5–5.3)
PROT SERPL-MCNC: 6.6 G/DL (ref 6.4–8.4)
RBC # BLD AUTO: 3.8 MILLION/UL (ref 3.81–5.12)
SODIUM SERPL-SCNC: 140 MMOL/L (ref 135–147)
TIBC SERPL-MCNC: 381 UG/DL (ref 250–450)
TRIGL SERPL-MCNC: 72 MG/DL
TSH SERPL DL<=0.05 MIU/L-ACNC: 1.57 UIU/ML (ref 0.45–4.5)
UIBC SERPL-MCNC: 177 UG/DL (ref 155–355)
WBC # BLD AUTO: 5.67 THOUSAND/UL (ref 4.31–10.16)

## 2024-09-13 PROCEDURE — 80061 LIPID PANEL: CPT

## 2024-09-13 PROCEDURE — 83550 IRON BINDING TEST: CPT

## 2024-09-13 PROCEDURE — 80053 COMPREHEN METABOLIC PANEL: CPT

## 2024-09-13 PROCEDURE — 82728 ASSAY OF FERRITIN: CPT

## 2024-09-13 PROCEDURE — 84702 CHORIONIC GONADOTROPIN TEST: CPT

## 2024-09-13 PROCEDURE — 36415 COLL VENOUS BLD VENIPUNCTURE: CPT

## 2024-09-13 PROCEDURE — 84443 ASSAY THYROID STIM HORMONE: CPT

## 2024-09-13 PROCEDURE — 85025 COMPLETE CBC W/AUTO DIFF WBC: CPT

## 2024-09-13 PROCEDURE — 83540 ASSAY OF IRON: CPT

## 2024-09-13 PROCEDURE — 99204 OFFICE O/P NEW MOD 45 MIN: CPT | Performed by: NURSE PRACTITIONER

## 2024-09-13 PROCEDURE — 83918 ORGANIC ACIDS TOTAL QUANT: CPT

## 2024-09-13 RX ORDER — SODIUM CHLORIDE 9 MG/ML
20 INJECTION, SOLUTION INTRAVENOUS ONCE
OUTPATIENT
Start: 2024-09-18

## 2024-09-13 RX ORDER — CYANOCOBALAMIN 1000 UG/ML
1000 INJECTION, SOLUTION INTRAMUSCULAR; SUBCUTANEOUS ONCE
Start: 2024-09-18 | End: 2024-09-18

## 2024-09-17 LAB — METHYLMALONATE SERPL-SCNC: 101 NMOL/L (ref 0–378)

## 2024-09-24 DIAGNOSIS — D50.0 IRON DEFICIENCY ANEMIA DUE TO CHRONIC BLOOD LOSS: Primary | ICD-10-CM

## 2024-09-24 RX ORDER — SODIUM CHLORIDE 9 MG/ML
20 INJECTION, SOLUTION INTRAVENOUS ONCE
Status: CANCELLED | OUTPATIENT
Start: 2024-09-26

## 2024-09-24 RX ORDER — CYANOCOBALAMIN 1000 UG/ML
1000 INJECTION, SOLUTION INTRAMUSCULAR; SUBCUTANEOUS ONCE
Status: CANCELLED
Start: 2024-09-26 | End: 2024-09-26

## 2024-09-26 ENCOUNTER — HOSPITAL ENCOUNTER (OUTPATIENT)
Dept: INFUSION CENTER | Facility: HOSPITAL | Age: 29
Discharge: HOME | End: 2024-09-26
Attending: INTERNAL MEDICINE
Payer: COMMERCIAL

## 2024-09-26 VITALS
RESPIRATION RATE: 18 BRPM | DIASTOLIC BLOOD PRESSURE: 62 MMHG | HEART RATE: 76 BPM | TEMPERATURE: 98.1 F | OXYGEN SATURATION: 100 % | SYSTOLIC BLOOD PRESSURE: 122 MMHG

## 2024-09-26 DIAGNOSIS — D50.0 IRON DEFICIENCY ANEMIA DUE TO CHRONIC BLOOD LOSS: Primary | ICD-10-CM

## 2024-09-26 PROCEDURE — 96366 THER/PROPH/DIAG IV INF ADDON: CPT

## 2024-09-26 PROCEDURE — 96372 THER/PROPH/DIAG INJ SC/IM: CPT

## 2024-09-26 PROCEDURE — 96365 THER/PROPH/DIAG IV INF INIT: CPT

## 2024-09-26 RX ORDER — CYANOCOBALAMIN 1000 UG/ML
1000 INJECTION, SOLUTION INTRAMUSCULAR; SUBCUTANEOUS ONCE
Status: CANCELLED
Start: 2024-10-04 | End: 2024-10-04

## 2024-09-26 RX ORDER — CYANOCOBALAMIN 1000 UG/ML
1000 INJECTION, SOLUTION INTRAMUSCULAR; SUBCUTANEOUS ONCE
Status: COMPLETED | OUTPATIENT
Start: 2024-09-26 | End: 2024-09-26

## 2024-09-26 RX ORDER — SODIUM CHLORIDE 9 MG/ML
20 INJECTION, SOLUTION INTRAVENOUS ONCE
Status: CANCELLED | OUTPATIENT
Start: 2024-10-04

## 2024-09-26 RX ORDER — SODIUM CHLORIDE 9 MG/ML
20 INJECTION, SOLUTION INTRAVENOUS ONCE
Status: COMPLETED | OUTPATIENT
Start: 2024-09-26 | End: 2024-09-26

## 2024-09-26 RX ADMIN — IRON SUCROSE 300 MG: 20 INJECTION, SOLUTION INTRAVENOUS at 10:26

## 2024-09-26 RX ADMIN — CYANOCOBALAMIN 1000 MCG: 1000 INJECTION, SOLUTION INTRAMUSCULAR at 10:28

## 2024-09-26 RX ADMIN — SODIUM CHLORIDE 20 ML/HR: 9 INJECTION, SOLUTION INTRAVENOUS at 10:26

## 2024-09-26 NOTE — PROGRESS NOTES
Pt tolerated venofer infusion with no adverse reactions. Iv removed. Pt left ambulatory with steady gait. Declined AVS     Aware of next appt 10/03/24 @ 1000 am

## 2024-10-02 ENCOUNTER — OFFICE VISIT (OUTPATIENT)
Dept: FAMILY MEDICINE CLINIC | Facility: CLINIC | Age: 29
End: 2024-10-02
Payer: COMMERCIAL

## 2024-10-02 VITALS
HEIGHT: 66 IN | RESPIRATION RATE: 20 BRPM | TEMPERATURE: 97.8 F | OXYGEN SATURATION: 99 % | BODY MASS INDEX: 31.76 KG/M2 | WEIGHT: 197.6 LBS | HEART RATE: 75 BPM | DIASTOLIC BLOOD PRESSURE: 68 MMHG | SYSTOLIC BLOOD PRESSURE: 112 MMHG

## 2024-10-02 DIAGNOSIS — Z11.59 NEED FOR HEPATITIS C SCREENING TEST: ICD-10-CM

## 2024-10-02 DIAGNOSIS — E66.811 CLASS 1 OBESITY WITH BODY MASS INDEX (BMI) OF 31.0 TO 31.9 IN ADULT, UNSPECIFIED OBESITY TYPE, UNSPECIFIED WHETHER SERIOUS COMORBIDITY PRESENT: ICD-10-CM

## 2024-10-02 DIAGNOSIS — Z11.4 SCREENING FOR HIV (HUMAN IMMUNODEFICIENCY VIRUS): ICD-10-CM

## 2024-10-02 DIAGNOSIS — R73.01 IFG (IMPAIRED FASTING GLUCOSE): ICD-10-CM

## 2024-10-02 DIAGNOSIS — Z00.00 ANNUAL PHYSICAL EXAM: Primary | ICD-10-CM

## 2024-10-02 DIAGNOSIS — D50.9 IRON DEFICIENCY ANEMIA, UNSPECIFIED IRON DEFICIENCY ANEMIA TYPE: ICD-10-CM

## 2024-10-02 DIAGNOSIS — G47.00 INSOMNIA, UNSPECIFIED TYPE: ICD-10-CM

## 2024-10-02 PROBLEM — Z33.2 ABORTION IN FIRST TRIMESTER: Status: RESOLVED | Noted: 2024-08-04 | Resolved: 2024-10-02

## 2024-10-02 PROBLEM — G89.29 CHRONIC LOW BACK PAIN: Status: RESOLVED | Noted: 2021-12-29 | Resolved: 2024-10-02

## 2024-10-02 PROBLEM — N94.6 DYSMENORRHEA: Status: RESOLVED | Noted: 2021-09-15 | Resolved: 2024-10-02

## 2024-10-02 PROBLEM — F41.8 MIXED ANXIETY AND DEPRESSIVE DISORDER: Status: RESOLVED | Noted: 2021-06-23 | Resolved: 2024-10-02

## 2024-10-02 PROBLEM — O03.4 RETAINED PRODUCTS OF CONCEPTION AFTER MISCARRIAGE: Status: RESOLVED | Noted: 2024-07-16 | Resolved: 2024-10-02

## 2024-10-02 PROBLEM — M54.50 CHRONIC LOW BACK PAIN: Status: RESOLVED | Noted: 2021-12-29 | Resolved: 2024-10-02

## 2024-10-02 PROCEDURE — 99395 PREV VISIT EST AGE 18-39: CPT | Performed by: PHYSICIAN ASSISTANT

## 2024-10-02 RX ORDER — IRON POLYSACCHARIDE COMPLEX 15MG/0.5ML
10 DROPS ORAL 2 TIMES DAILY
COMMUNITY

## 2024-10-02 RX ORDER — TRAZODONE HYDROCHLORIDE 50 MG/1
50 TABLET, FILM COATED ORAL
Qty: 30 TABLET | Refills: 1 | Status: SHIPPED | OUTPATIENT
Start: 2024-10-02

## 2024-10-02 RX ORDER — TIRZEPATIDE 2.5 MG/.5ML
2.5 INJECTION, SOLUTION SUBCUTANEOUS WEEKLY
Qty: 2 ML | Refills: 0 | Status: SHIPPED | OUTPATIENT
Start: 2024-10-02 | End: 2024-10-30

## 2024-10-02 NOTE — PATIENT INSTRUCTIONS
"Patient Education     Routine physical for adults   The Basics   Written by the doctors and editors at [facility]   What is a physical? -- A physical is a routine visit, or \"check-up,\" with your doctor. You might also hear it called a \"wellness visit\" or \"preventive visit.\"  During each visit, the doctor will:   Ask about your physical and mental health   Ask about your habits, behaviors, and lifestyle   Do an exam   Give you vaccines if needed   Talk to you about any medicines you take   Give advice about your health   Answer your questions  Getting regular check-ups is an important part of taking care of your health. It can help your doctor find and treat any problems you have. But it's also important for preventing health problems.  A routine physical is different from a \"sick visit.\" A sick visit is when you see a doctor because of a health concern or problem. Since physicals are scheduled ahead of time, you can think about what you want to ask the doctor.  How often should I get a physical? -- It depends on your age and health. In general, for people age 21 years and older:   If you are younger than 50 years, you might be able to get a physical every 3 years.   If you are 50 years or older, your doctor might recommend a physical every year.  If you have an ongoing health condition, like diabetes or high blood pressure, your doctor will probably want to see you more often.  What happens during a physical? -- In general, each visit will include:   Physical exam - The doctor or nurse will check your height, weight, heart rate, and blood pressure. They will also look at your eyes and ears. They will ask about how you are feeling and whether you have any symptoms that bother you.   Medicines - It's a good idea to bring a list of all the medicines you take to each doctor visit. Your doctor will talk to you about your medicines and answer any questions. Tell them if you are having any side effects that bother you. You " "should also tell them if you are having trouble paying for any of your medicines.   Habits and behaviors - This includes:   Your diet   Your exercise habits   Whether you smoke, drink alcohol, or use drugs   Whether you are sexually active   Whether you feel safe at home  Your doctor will talk to you about things you can do to improve your health and lower your risk of health problems. They will also offer help and support. For example, if you want to quit smoking, they can give you advice and might prescribe medicines. If you want to improve your diet or get more physical activity, they can help you with this, too.   Lab tests, if needed - The tests you get will depend on your age and situation. For example, your doctor might want to check your:   Cholesterol   Blood sugar   Iron level   Vaccines - The recommended vaccines will depend on your age, health, and what vaccines you already had. Vaccines are very important because they can prevent certain serious or deadly infections.   Discussion of screening - \"Screening\" means checking for diseases or other health problems before they cause symptoms. Your doctor can recommend screening based on your age, risk, and preferences. This might include tests to check for:   Cancer, such as breast, prostate, cervical, ovarian, colorectal, prostate, lung, or skin cancer   Sexually transmitted infections, such as chlamydia and gonorrhea   Mental health conditions like depression and anxiety  Your doctor will talk to you about the different types of screening tests. They can help you decide which screenings to have. They can also explain what the results might mean.   Answering questions - The physical is a good time to ask the doctor or nurse questions about your health. If needed, they can refer you to other doctors or specialists, too.  Adults older than 65 years often need other care, too. As you get older, your doctor will talk to you about:   How to prevent falling at " home   Hearing or vision tests   Memory testing   How to take your medicines safely   Making sure that you have the help and support you need at home  All topics are updated as new evidence becomes available and our peer review process is complete.  This topic retrieved from [employer] on: May 02, 2024.  Topic 331675 Version 1.0  Release: 32.4.3 - C32.122  © 2024 UpToDate, Inc. and/or its affiliates. All rights reserved.  Consumer Information Use and Disclaimer   Disclaimer: This generalized information is a limited summary of diagnosis, treatment, and/or medication information. It is not meant to be comprehensive and should be used as a tool to help the user understand and/or assess potential diagnostic and treatment options. It does NOT include all information about conditions, treatments, medications, side effects, or risks that may apply to a specific patient. It is not intended to be medical advice or a substitute for the medical advice, diagnosis, or treatment of a health care provider based on the health care provider's examination and assessment of a patient's specific and unique circumstances. Patients must speak with a health care provider for complete information about their health, medical questions, and treatment options, including any risks or benefits regarding use of medications. This information does not endorse any treatments or medications as safe, effective, or approved for treating a specific patient. UpToDate, Inc. and its affiliates disclaim any warranty or liability relating to this information or the use thereof.The use of this information is governed by the Terms of Use, available at https://www.woltersSurveypaluwer.com/en/know/clinical-effectiveness-terms. 2024© UpToDate, Inc. and its affiliates and/or licensors. All rights reserved.  Copyright   © 2024 UpToDate, Inc. and/or its affiliates. All rights reserved.

## 2024-10-02 NOTE — PROGRESS NOTES
Adult Annual Physical  Name: Yun Mancia      : 1995      MRN: [id number]  Encounter Provider: Flor Sheikh PA-C  Encounter Date: 10/2/2024   Encounter department: [facility]IMARY [facility]    Assessment & Plan  Annual physical exam         Class 1 obesity with body mass index (BMI) of 31.0 to 31.9 in adult, unspecified obesity type, unspecified whether serious comorbidity present  Patient will continue with regular exercise.  She was encouraged to try eating several small meals throughout the day as opposed to 1 meal.  Patient expressed interest in starting medication to also assist in weight loss.  She was given a prescription for Zepbound 2.5 mg weekly to initiate.  She will reach out after taking her third dose with an update on tolerance and request for neck strength as long as she is tolerating it well. Patient denies personal history of pancreatitis. Patient also denies personal and family history of thyroid cancer and multiple endocrine neoplasia type 2 (MEN 2 tumor).  She will follow-up here in about 2 months.  She was encouraged to call with any concerns in the interim.  Orders:    tirzepatide (Zepbound) 2.5 mg/0.5 mL auto-injector; Inject 0.5 mL (2.5 mg total) under the skin once a week for 28 days    Insomnia, unspecified type  Given prescription for trazodone 50 mg to try at bedtime as needed.  Orders:    traZODone (DESYREL) 50 mg tablet; Take 1 tablet (50 mg total) by mouth daily at bedtime as needed for sleep    IFG (impaired fasting glucose)  Recheck with labs as ordered.  Orders:    Comprehensive metabolic panel; Future    Hemoglobin A1C    Screening for HIV (human immunodeficiency virus)    Orders:    HIV 1/2 AG/AB w Reflex [facility] for 2 yr old and above; Future    Need for hepatitis C screening test    Orders:    Hepatitis C antibody; Future    Iron deficiency anemia, unspecified iron deficiency anemia type  Patient is following with hematology.  She is just starting  with iron infusions and notes that she also gets B12 injections with the infusions.  She will continue to follow with hematology as directed.           Immunizations and preventive care screenings were discussed with patient today. Appropriate education was printed on patient's after visit summary.    Counseling:  Dental Health: discussed importance of regular dental visits.  Exercise: the importance of regular exercise/physical activity was discussed. Recommend exercise 3-5 times per week for at least 30 minutes.          History of Present Illness     Adult Annual Physical:  Patient presents for annual physical.     ALEJANDRO - Hgb on 9/13/24 was 8.5 - seeing Heme and getting iron infusions - had one so far and also gets a B12 injection     Notes that she has been working on her weight - has been walking daily and going to the gym regularly since here 3 months ago - notes that she has been eating mainly 1 meal a day - want med to help since not making progress on her own       .     Diet and Physical Activity:  - Diet/Nutrition:. mainly one meal daily  - Exercise: walking. and notes that she is at the gym 3 times weekly    Depression Screening:  - PHQ-2 Score: 4  - PHQ-9 Score: 13    General Health:  - Sleep: sleeps poorly. taking melatonin but not working well  - Hearing: normal hearing bilateral ears.  - Vision: most recent eye exam > 1 year ago.  - Dental: no dental visits for > 1 year.    /GYN Health:  - Follows with GYN: yes.   - Menopause: premenopausal.   - Last menstrual cycle: 9/13/2024.     Advanced Care Planning:  - Has an advanced directive?: no    - Has a durable medical POA?: no    - ACP document given to patient?: yes      Review of Systems   Constitutional:  Negative for chills and fever.   HENT:  Negative for congestion, rhinorrhea and sore throat.    Eyes:  Negative for visual disturbance.   Respiratory:  Positive for shortness of breath (with exertion - likely from severe ALEJANDRO per Hematology).  "Negative for cough and wheezing.    Cardiovascular:  Negative for chest pain, palpitations and leg swelling.   Gastrointestinal:  Negative for abdominal pain, blood in stool, constipation, diarrhea, nausea and vomiting.   Genitourinary:  Negative for dysuria.   Musculoskeletal:  Negative for arthralgias and myalgias.   Skin:  Negative for rash.   Neurological:  Negative for dizziness, syncope and headaches.   Hematological:  Does not bruise/bleed easily.   Psychiatric/Behavioral:  Positive for sleep disturbance. Negative for dysphoric mood. The patient is not nervous/anxious.      Medical History Reviewed by provider this encounter:  Tobacco  Allergies  Surg Hx  Fam Hx  Soc Hx        Objective     /68 (BP Location: Left arm, Patient Position: Sitting, Cuff Size: Large)   Pulse 75   Temp 97.8 °F (36.6 °C) (Tympanic)   Resp 20   Ht 5' 6\" (1.676 m)   Wt 89.6 kg (197 lb 9.6 oz)   LMP 09/13/2024   SpO2 99%   BMI 31.89 kg/m²     Physical Exam  Vitals reviewed.   Constitutional:       General: She is not in acute distress.     Appearance: Normal appearance. She is well-developed. She is obese. She is not ill-appearing.   HENT:      Head: Normocephalic and atraumatic.      Right Ear: Tympanic membrane, ear canal and external ear normal.      Left Ear: Tympanic membrane, ear canal and external ear normal.      Mouth/Throat:      Mouth: Mucous membranes are moist.      Pharynx: No oropharyngeal exudate.   Eyes:      Conjunctiva/sclera: Conjunctivae normal.      Pupils: Pupils are equal, round, and reactive to light.   Neck:      Thyroid: No thyromegaly.      Vascular: No carotid bruit.   Cardiovascular:      Rate and Rhythm: Normal rate and regular rhythm.      Pulses: Normal pulses.      Heart sounds: Normal heart sounds. No murmur heard.  Pulmonary:      Effort: Pulmonary effort is normal.      Breath sounds: Normal breath sounds. No wheezing, rhonchi or rales.   Abdominal:      General: Bowel sounds are " normal. There is no distension.      Palpations: Abdomen is soft. There is no mass.      Tenderness: There is no abdominal tenderness. There is no guarding.   Musculoskeletal:      Cervical back: Normal range of motion and neck supple.      Right lower leg: No edema.      Left lower leg: No edema.   Lymphadenopathy:      Cervical: No cervical adenopathy.   Skin:     General: Skin is warm and dry.      Findings: No rash.   Neurological:      Mental Status: She is alert.      Sensory: No sensory deficit.      Motor: No weakness.      Comments: 5/5 strength in UE and LE   Psychiatric:         Mood and Affect: Mood normal.         Behavior: Behavior normal.         Thought Content: Thought content normal.         Judgment: Judgment normal.

## 2024-10-03 ENCOUNTER — HOSPITAL ENCOUNTER (OUTPATIENT)
Dept: INFUSION CENTER | Facility: HOSPITAL | Age: 29
End: 2024-10-03
Attending: INTERNAL MEDICINE

## 2024-10-04 ENCOUNTER — HOSPITAL ENCOUNTER (OUTPATIENT)
Dept: INFUSION CENTER | Facility: HOSPITAL | Age: 29
End: 2024-10-04
Attending: INTERNAL MEDICINE
Payer: COMMERCIAL

## 2024-10-04 ENCOUNTER — TELEPHONE (OUTPATIENT)
Age: 29
End: 2024-10-04

## 2024-10-04 VITALS
DIASTOLIC BLOOD PRESSURE: 85 MMHG | RESPIRATION RATE: 12 BRPM | HEART RATE: 90 BPM | TEMPERATURE: 97.6 F | SYSTOLIC BLOOD PRESSURE: 141 MMHG | OXYGEN SATURATION: 100 %

## 2024-10-04 DIAGNOSIS — D50.0 IRON DEFICIENCY ANEMIA DUE TO CHRONIC BLOOD LOSS: Primary | ICD-10-CM

## 2024-10-04 PROBLEM — E66.811 CLASS 1 OBESITY WITH BODY MASS INDEX (BMI) OF 31.0 TO 31.9 IN ADULT: Status: ACTIVE | Noted: 2024-10-04

## 2024-10-04 PROBLEM — R73.01 IFG (IMPAIRED FASTING GLUCOSE): Status: ACTIVE | Noted: 2024-10-04

## 2024-10-04 PROBLEM — G47.00 INSOMNIA: Status: ACTIVE | Noted: 2024-10-04

## 2024-10-04 PROCEDURE — 96372 THER/PROPH/DIAG INJ SC/IM: CPT

## 2024-10-04 PROCEDURE — 96365 THER/PROPH/DIAG IV INF INIT: CPT

## 2024-10-04 PROCEDURE — 96366 THER/PROPH/DIAG IV INF ADDON: CPT

## 2024-10-04 RX ORDER — CYANOCOBALAMIN 1000 UG/ML
1000 INJECTION, SOLUTION INTRAMUSCULAR; SUBCUTANEOUS ONCE
Status: CANCELLED
Start: 2024-10-11 | End: 2024-10-11

## 2024-10-04 RX ORDER — SODIUM CHLORIDE 9 MG/ML
20 INJECTION, SOLUTION INTRAVENOUS ONCE
Status: CANCELLED | OUTPATIENT
Start: 2024-10-11

## 2024-10-04 RX ORDER — SODIUM CHLORIDE 9 MG/ML
20 INJECTION, SOLUTION INTRAVENOUS ONCE
Status: COMPLETED | OUTPATIENT
Start: 2024-10-04 | End: 2024-10-04

## 2024-10-04 RX ORDER — CYANOCOBALAMIN 1000 UG/ML
1000 INJECTION, SOLUTION INTRAMUSCULAR; SUBCUTANEOUS ONCE
Status: COMPLETED | OUTPATIENT
Start: 2024-10-04 | End: 2024-10-04

## 2024-10-04 RX ADMIN — SODIUM CHLORIDE 20 ML/HR: 9 INJECTION, SOLUTION INTRAVENOUS at 11:17

## 2024-10-04 RX ADMIN — CYANOCOBALAMIN 1000 MCG: 1000 INJECTION, SOLUTION INTRAMUSCULAR at 11:19

## 2024-10-04 RX ADMIN — IRON SUCROSE 300 MG: 20 INJECTION, SOLUTION INTRAVENOUS at 11:17

## 2024-10-04 NOTE — ASSESSMENT & PLAN NOTE
Patient will continue with regular exercise.  She was encouraged to try eating several small meals throughout the day as opposed to 1 meal.  Patient expressed interest in starting medication to also assist in weight loss.  She was given a prescription for Zepbound 2.5 mg weekly to initiate.  She will reach out after taking her third dose with an update on tolerance and request for neck strength as long as she is tolerating it well. Patient denies personal history of pancreatitis. Patient also denies personal and family history of thyroid cancer and multiple endocrine neoplasia type 2 (MEN 2 tumor).  She will follow-up here in about 2 months.  She was encouraged to call with any concerns in the interim.  Orders:    tirzepatide (Zepbound) 2.5 mg/0.5 mL auto-injector; Inject 0.5 mL (2.5 mg total) under the skin once a week for 28 days

## 2024-10-04 NOTE — PROGRESS NOTES
Yun Mancia  tolerated treatment well with no complications.      Yun Mancia is aware of future appt on 10/10/2024 at 1130.     AVS printed and given to Yun Mancia:   No (Declined by Yun Mancia)

## 2024-10-04 NOTE — TELEPHONE ENCOUNTER
PA for (Zepbound) 2.5 mg/0.5 mL SUBMITTED     via    []CMM-KEY:   [x]Surescripts-Case ID # [id number]   []Availity-Auth ID # NDC #   []Faxed to plan   []Other website   []Phone call Case ID #     Office notes sent, clinical questions answered. Awaiting determination    Turnaround time for your insurance to make a decision on your Prior Authorization can take 7-21 business days.

## 2024-10-04 NOTE — ASSESSMENT & PLAN NOTE
Recheck with labs as ordered.  Orders:    Comprehensive metabolic panel; Future    Hemoglobin A1C

## 2024-10-04 NOTE — ASSESSMENT & PLAN NOTE
Patient is following with hematology.  She is just starting with iron infusions and notes that she also gets B12 injections with the infusions.  She will continue to follow with hematology as directed.

## 2024-10-04 NOTE — ASSESSMENT & PLAN NOTE
Given prescription for trazodone 50 mg to try at bedtime as needed.  Orders:    traZODone (DESYREL) 50 mg tablet; Take 1 tablet (50 mg total) by mouth daily at bedtime as needed for sleep

## 2024-10-07 NOTE — TELEPHONE ENCOUNTER
PA for (Zepbound) 2.5 mg/0.5 mL  APPROVED     Date(s) approved October 4, 2024 to April 4, 2025     Case #[id number]     Patient advised by          [x]MyChart Message  []Phone call   []LMOM  []L/M to call office as no active Communication consent on file  []Unable to leave detailed message as VM not approved on Communication consent       Pharmacy advised by    [x]Fax  []Phone call    Approval letter scanned into Media NO  Did not receive upon decision

## 2024-10-08 NOTE — ADDENDUM NOTE
Encounter addended by: Angel Wayne, Pharmacist on: 10/8/2024 10:17 AM   Actions taken: [employer]ent created or edited

## 2024-10-10 ENCOUNTER — HOSPITAL ENCOUNTER (OUTPATIENT)
Dept: INFUSION CENTER | Facility: HOSPITAL | Age: 29
Discharge: HOME | End: 2024-10-10
Attending: INTERNAL MEDICINE
Payer: COMMERCIAL

## 2024-10-10 VITALS
DIASTOLIC BLOOD PRESSURE: 81 MMHG | SYSTOLIC BLOOD PRESSURE: 128 MMHG | TEMPERATURE: 97.2 F | RESPIRATION RATE: 18 BRPM | HEART RATE: 79 BPM | OXYGEN SATURATION: 100 %

## 2024-10-10 DIAGNOSIS — D50.0 IRON DEFICIENCY ANEMIA DUE TO CHRONIC BLOOD LOSS: Primary | ICD-10-CM

## 2024-10-10 PROCEDURE — 96366 THER/PROPH/DIAG IV INF ADDON: CPT

## 2024-10-10 PROCEDURE — 96365 THER/PROPH/DIAG IV INF INIT: CPT

## 2024-10-10 PROCEDURE — 96372 THER/PROPH/DIAG INJ SC/IM: CPT

## 2024-10-10 RX ORDER — SODIUM CHLORIDE 9 MG/ML
20 INJECTION, SOLUTION INTRAVENOUS ONCE
Status: COMPLETED | OUTPATIENT
Start: 2024-10-10 | End: 2024-10-10

## 2024-10-10 RX ORDER — SODIUM CHLORIDE 9 MG/ML
20 INJECTION, SOLUTION INTRAVENOUS ONCE
Status: CANCELLED | OUTPATIENT
Start: 2024-10-17

## 2024-10-10 RX ORDER — CYANOCOBALAMIN 1000 UG/ML
1000 INJECTION, SOLUTION INTRAMUSCULAR; SUBCUTANEOUS ONCE
Status: COMPLETED | OUTPATIENT
Start: 2024-10-10 | End: 2024-10-10

## 2024-10-10 RX ORDER — CYANOCOBALAMIN 1000 UG/ML
1000 INJECTION, SOLUTION INTRAMUSCULAR; SUBCUTANEOUS ONCE
Status: CANCELLED
Start: 2024-10-17 | End: 2024-10-17

## 2024-10-10 RX ADMIN — SODIUM CHLORIDE 20 ML/HR: 9 INJECTION, SOLUTION INTRAVENOUS at 14:50

## 2024-10-10 RX ADMIN — CYANOCOBALAMIN 1000 MCG: 1000 INJECTION, SOLUTION INTRAMUSCULAR at 14:59

## 2024-10-10 RX ADMIN — IRON SUCROSE 300 MG: 20 INJECTION, SOLUTION INTRAVENOUS at 14:50

## 2024-10-10 NOTE — PROGRESS NOTES
..Yun Mancia  tolerated treatment well with no complications.      Yun Mancia is aware of future appt on 10/17 at 10:30.     AVS printed and given to Yun Mancia:  No (Declined by Yun Mancai)

## 2024-10-17 ENCOUNTER — HOSPITAL ENCOUNTER (OUTPATIENT)
Dept: INFUSION CENTER | Facility: HOSPITAL | Age: 29
Discharge: HOME | End: 2024-10-17
Attending: INTERNAL MEDICINE
Payer: COMMERCIAL

## 2024-10-17 DIAGNOSIS — D50.0 IRON DEFICIENCY ANEMIA DUE TO CHRONIC BLOOD LOSS: Primary | ICD-10-CM

## 2024-10-17 PROCEDURE — 96366 THER/PROPH/DIAG IV INF ADDON: CPT

## 2024-10-17 PROCEDURE — 96372 THER/PROPH/DIAG INJ SC/IM: CPT

## 2024-10-17 PROCEDURE — 96365 THER/PROPH/DIAG IV INF INIT: CPT

## 2024-10-17 RX ORDER — CYANOCOBALAMIN 1000 UG/ML
1000 INJECTION, SOLUTION INTRAMUSCULAR; SUBCUTANEOUS ONCE
Status: CANCELLED
Start: 2024-10-24 | End: 2024-10-24

## 2024-10-17 RX ORDER — CYANOCOBALAMIN 1000 UG/ML
1000 INJECTION, SOLUTION INTRAMUSCULAR; SUBCUTANEOUS ONCE
Status: COMPLETED | OUTPATIENT
Start: 2024-10-17 | End: 2024-10-17

## 2024-10-17 RX ORDER — SODIUM CHLORIDE 9 MG/ML
20 INJECTION, SOLUTION INTRAVENOUS ONCE
Status: COMPLETED | OUTPATIENT
Start: 2024-10-17 | End: 2024-10-17

## 2024-10-17 RX ORDER — SODIUM CHLORIDE 9 MG/ML
20 INJECTION, SOLUTION INTRAVENOUS ONCE
Status: CANCELLED | OUTPATIENT
Start: 2024-10-24

## 2024-10-17 RX ADMIN — SODIUM CHLORIDE 20 ML/HR: 9 INJECTION, SOLUTION INTRAVENOUS at 10:50

## 2024-10-17 RX ADMIN — CYANOCOBALAMIN 1000 MCG: 1000 INJECTION, SOLUTION INTRAMUSCULAR at 10:59

## 2024-10-17 RX ADMIN — IRON SUCROSE 300 MG: 20 INJECTION, SOLUTION INTRAVENOUS at 10:50

## 2024-10-17 NOTE — PROGRESS NOTES
Pt tolerated treatment with no adv reactions; therapy plan completed; pt has f/u appt scheduled with physician; pt left unit ambulatory with steady gait.

## 2024-10-23 DIAGNOSIS — E66.811 CLASS 1 OBESITY WITH BODY MASS INDEX (BMI) OF 31.0 TO 31.9 IN ADULT, UNSPECIFIED OBESITY TYPE, UNSPECIFIED WHETHER SERIOUS COMORBIDITY PRESENT: ICD-10-CM

## 2024-10-23 RX ORDER — TIRZEPATIDE 2.5 MG/.5ML
2.5 INJECTION, SOLUTION SUBCUTANEOUS WEEKLY
Qty: 2 ML | Refills: 0 | Status: CANCELLED | OUTPATIENT
Start: 2024-10-23 | End: 2024-11-20

## 2024-10-24 ENCOUNTER — TELEPHONE (OUTPATIENT)
Age: 29
End: 2024-10-24

## 2024-10-24 RX ORDER — TIRZEPATIDE 5 MG/.5ML
5 INJECTION, SOLUTION SUBCUTANEOUS WEEKLY
Qty: 2 ML | Refills: 0 | Status: SHIPPED | OUTPATIENT
Start: 2024-10-24

## 2024-10-24 NOTE — TELEPHONE ENCOUNTER
PA for (Zepbound) 5 mg SUBMITTED     via    []CMM-KEY:    [x]Surescripts-Case ID # [id number]   []Availity-Auth ID #  NDC #    []Faxed to plan   []Other website    []Phone call Case ID #      Office notes sent, clinical questions answered. Awaiting determination    Turnaround time for your insurance to make a decision on your Prior Authorization can take 7-21 business days.         ]

## 2024-10-24 NOTE — TELEPHONE ENCOUNTER
PA for (Zepbound) 5 mg/0.5 mL  NOT REQUIRED              Pharmacy advised by    [x]Fax  []Phone call

## 2024-11-05 NOTE — PROGRESS NOTES
Ambulatory Visit  Name: Yun Mancia      : 1995      MRN: [id number]  Encounter Provider: BRITNEY Wilkerson  Encounter Date: 2024   Encounter department: [facility] [place]    Assessment & Plan  Menorrhagia with irregular cycle  -Reviewed history of menorrhagia and ALEJANDRO, requiring venofer infusions.  -Discussed treatment options for contraception and menorrhagia, including hormonal birth control (see counseling).  Orders:    Ambulatory Referral to Obstetrics / Gynecology     in first trimester    Orders:    Ambulatory Referral to Obstetrics / Gynecology    Acute vaginitis  -STI and genital cultures collected today to r/o infection.  -UA/UC collected to r/o UTI due to burning.   -Clinically evident candida vaginitis noted on exam. Treatment sent to pharmacy along with mycolog ointment for vulvar irritation/burning.  -For prevention: Recommend acidophilus or yogurt daily, avoid irritating soaps, lubricants, or lotions, no tight fitted pants or underwear, avoid bubble baths, do not stay in wet swimsuit/moist clothing.     Orders:    Urinalysis with microscopic    Urine culture    Chlamydia/GC amplified DNA by PCR    Molecular Vaginal Panel    fluconazole (DIFLUCAN) 150 mg tablet; Take 1 tablet every 3 days.    nystatin-triamcinolone (MYCOLOG-II) ointment; Apply topically 2 (two) times a day    Cervical cancer screening  -Pap collected today for up-to-date screening prior to IUD procedure.  Orders:    Liquid-based pap, screening    Birth control counseling  Contraceptive options were reviewed, including hormonal methods, both combination (pill, patch, vaginal ring) and progesterone-only (pill, Depo Provera and Nexplanon), intrauterine devices (Mirena, Denise and Paraguard), barrier methods (condoms, diaphragm) and male/female sterilization.  The mechanisms, risks, benefits and side effects of all methods were discussed.  All questions have been answered to her  "satisfaction.    Patient is most interested in IUD.       Counseling for birth control regarding intrauterine device (IUD)  -Counseled patient on mechanism of action of the hormonal IUDs (Denise, Kyleena, Mirena), with review of efficacy and length of contraception. Counseled patient on benefits, changes to menses, side effects, and procedure with risks of procedure.   -Advised motrin/tylenol 30 minutes prior to appointment.   -CPT/Billing codes given to patient; scheduled prior to leaving appointment.         History of Present Illness       Yun Mancia is a 29 y.o. female who presents for multiple concerns today.   She reports itching and burning of the vulva with discharge and foul odor.  She reports discharge is white and creamy.  Symptoms started end of last week.   She has not tried at home treatments.    She is sexually active with boyfriend.   She occasionally utilizes condoms.  Denies history of STD/STI.  She reports burning when urine hits the vulva but denies frequency/urgency/pain.     She had D&E on May 17th at 6 weeks GA and was having persistent bleeding. She went to ED on 07/16/24 for bleeding; had D&C.   Hx of IDAk, requiring Venofer    Periods are regular, every month, lasting 5 days  +menorrhagia, changing tampon and pad every 2 hours.  Denies dysmenorrhea  Denies any changes in bowel/bladder habits, pelvic pain, bloating, abdominal pain, N/V, changes in appetite, thyroid disease  Denies history of GYN issues  Denies history of diabetes, HTN, migraine with aura, or blood clots  Denies history of smoking  Denies family history or personal history of blood clots or bleeding disorders    Review of Systems  Pertinent items are noted in HPI.     Objective      /90 (BP Location: Left arm, Patient Position: Sitting, Cuff Size: Standard)   Ht 5' 6\" (1.676 m)   Wt 88 kg (194 lb)   LMP 11/03/2024 (Exact Date)   BMI 31.31 kg/m²   Physical Exam  Vitals and nursing note reviewed.   Constitutional:  "      General: She is not in acute distress.     Appearance: Normal appearance.   HENT:      Head: Normocephalic.   Eyes:      Conjunctiva/sclera: Conjunctivae normal.   Cardiovascular:      Rate and Rhythm: Normal rate and regular rhythm.      Heart sounds: Normal heart sounds.   Pulmonary:      Effort: Pulmonary effort is normal.      Breath sounds: Normal breath sounds.   Abdominal:      General: Abdomen is flat.      Palpations: Abdomen is soft.   Genitourinary:     General: Normal vulva.      Exam position: Lithotomy position.      Pubic Area: No rash or pubic lice.       Labia:         Right: Tenderness present. No rash.         Left: Tenderness present. No rash.       Urethra: No urethral pain.      Vagina: Vaginal discharge present.      Cervix: Normal. No discharge or cervical bleeding.      Comments: Edematous and irritated labia noted with thick, creamy white discharge in posterior vaginal vault. Scant spotting noted from internal cervical os. Patient reported burning sensation with speculum exam.  Musculoskeletal:         General: Normal range of motion.      Cervical back: Normal range of motion.      Right lower leg: No edema.      Left lower leg: No edema.   Lymphadenopathy:      Lower Body: No right inguinal adenopathy. No left inguinal adenopathy.   Skin:     General: Skin is warm and dry.   Neurological:      Mental Status: She is alert and oriented to person, place, and time.   Psychiatric:         Mood and Affect: Mood normal.         Behavior: Behavior normal.         Thought Content: Thought content normal.         Judgment: Judgment normal.

## 2024-11-08 ENCOUNTER — OFFICE VISIT (OUTPATIENT)
Dept: OBGYN CLINIC | Facility: CLINIC | Age: 29
End: 2024-11-08
Payer: COMMERCIAL

## 2024-11-08 VITALS
HEIGHT: 66 IN | BODY MASS INDEX: 31.18 KG/M2 | SYSTOLIC BLOOD PRESSURE: 126 MMHG | WEIGHT: 194 LBS | DIASTOLIC BLOOD PRESSURE: 90 MMHG

## 2024-11-08 DIAGNOSIS — Z30.09 COUNSELING FOR BIRTH CONTROL REGARDING INTRAUTERINE DEVICE (IUD): ICD-10-CM

## 2024-11-08 DIAGNOSIS — Z12.4 CERVICAL CANCER SCREENING: ICD-10-CM

## 2024-11-08 DIAGNOSIS — N76.0 ACUTE VAGINITIS: Primary | ICD-10-CM

## 2024-11-08 DIAGNOSIS — Z33.2 ABORTION IN FIRST TRIMESTER: ICD-10-CM

## 2024-11-08 DIAGNOSIS — N92.1 MENORRHAGIA WITH IRREGULAR CYCLE: ICD-10-CM

## 2024-11-08 DIAGNOSIS — Z30.09 BIRTH CONTROL COUNSELING: ICD-10-CM

## 2024-11-08 PROCEDURE — 99203 OFFICE O/P NEW LOW 30 MIN: CPT

## 2024-11-08 PROCEDURE — G0145 SCR C/V CYTO,THINLAYER,RESCR: HCPCS

## 2024-11-08 PROCEDURE — 87491 CHLMYD TRACH DNA AMP PROBE: CPT

## 2024-11-08 PROCEDURE — 81001 URINALYSIS AUTO W/SCOPE: CPT

## 2024-11-08 PROCEDURE — 87591 N.GONORRHOEAE DNA AMP PROB: CPT

## 2024-11-08 PROCEDURE — 87086 URINE CULTURE/COLONY COUNT: CPT

## 2024-11-08 PROCEDURE — 81514 NFCT DS BV&VAGINITIS DNA ALG: CPT

## 2024-11-08 RX ORDER — FLUCONAZOLE 150 MG/1
TABLET ORAL
Qty: 2 TABLET | Refills: 0 | Status: SHIPPED | OUTPATIENT
Start: 2024-11-08 | End: 2024-11-14

## 2024-11-08 RX ORDER — NYSTATIN AND TRIAMCINOLONE ACETONIDE 100000; 1 [USP'U]/G; MG/G
OINTMENT TOPICAL 2 TIMES DAILY
Qty: 30 G | Refills: 0 | Status: SHIPPED | OUTPATIENT
Start: 2024-11-08

## 2024-11-09 LAB
BACTERIA UR QL AUTO: ABNORMAL /HPF
BILIRUB UR QL STRIP: NEGATIVE
CLARITY UR: CLEAR
COLOR UR: ABNORMAL
GLUCOSE UR STRIP-MCNC: NEGATIVE MG/DL
HGB UR QL STRIP.AUTO: ABNORMAL
KETONES UR STRIP-MCNC: NEGATIVE MG/DL
LEUKOCYTE ESTERASE UR QL STRIP: NEGATIVE
NITRITE UR QL STRIP: NEGATIVE
NON-SQ EPI CELLS URNS QL MICRO: ABNORMAL /HPF
PH UR STRIP.AUTO: 6 [PH]
PROT UR STRIP-MCNC: NEGATIVE MG/DL
RBC #/AREA URNS AUTO: ABNORMAL /HPF
SP GR UR STRIP.AUTO: 1.01 (ref 1–1.03)
UROBILINOGEN UR STRIP-ACNC: <2 MG/DL
WBC #/AREA URNS AUTO: ABNORMAL /HPF

## 2024-11-10 LAB
BACTERIA UR CULT: NORMAL
C GLABRATA DNA VAG QL NAA+PROBE: NEGATIVE
C KRUSEI DNA VAG QL NAA+PROBE: NEGATIVE
C TRACH DNA SPEC QL NAA+PROBE: NEGATIVE
CANDIDA SP 6 PNL VAG NAA+PROBE: NEGATIVE
N GONORRHOEA DNA SPEC QL NAA+PROBE: NEGATIVE
T VAGINALIS DNA VAG QL NAA+PROBE: NEGATIVE
VAGINOSIS/ITIS DNA PNL VAG PROBE+SIG AMP: POSITIVE

## 2024-11-11 DIAGNOSIS — N76.0 BV (BACTERIAL VAGINOSIS): Primary | ICD-10-CM

## 2024-11-11 DIAGNOSIS — B96.89 BV (BACTERIAL VAGINOSIS): Primary | ICD-10-CM

## 2024-11-11 RX ORDER — METRONIDAZOLE 500 MG/1
500 TABLET ORAL EVERY 12 HOURS SCHEDULED
Qty: 14 TABLET | Refills: 0 | Status: SHIPPED | OUTPATIENT
Start: 2024-11-11 | End: 2024-11-18

## 2024-11-15 ENCOUNTER — RESULTS FOLLOW-UP (OUTPATIENT)
Dept: OBGYN CLINIC | Facility: CLINIC | Age: 29
End: 2024-11-15

## 2024-11-15 LAB
LAB AP GYN PRIMARY INTERPRETATION: NORMAL
Lab: NORMAL
PATH INTERP SPEC-IMP: NORMAL

## 2024-11-25 DIAGNOSIS — E66.811 CLASS 1 OBESITY WITH BODY MASS INDEX (BMI) OF 31.0 TO 31.9 IN ADULT, UNSPECIFIED OBESITY TYPE, UNSPECIFIED WHETHER SERIOUS COMORBIDITY PRESENT: ICD-10-CM

## 2024-11-25 RX ORDER — TIRZEPATIDE 5 MG/.5ML
5 INJECTION, SOLUTION SUBCUTANEOUS WEEKLY
Qty: 2 ML | Refills: 0 | Status: CANCELLED | OUTPATIENT
Start: 2024-11-25

## 2024-11-27 RX ORDER — TIRZEPATIDE 7.5 MG/.5ML
7.5 INJECTION, SOLUTION SUBCUTANEOUS WEEKLY
Qty: 2 ML | Refills: 0 | Status: SHIPPED | OUTPATIENT
Start: 2024-11-27

## 2024-11-29 DIAGNOSIS — G47.00 INSOMNIA, UNSPECIFIED TYPE: ICD-10-CM

## 2024-12-01 RX ORDER — TRAZODONE HYDROCHLORIDE 50 MG/1
50 TABLET, FILM COATED ORAL
Qty: 30 TABLET | Refills: 1 | Status: SHIPPED | OUTPATIENT
Start: 2024-12-01

## 2024-12-20 ENCOUNTER — OFFICE VISIT (OUTPATIENT)
Dept: FAMILY MEDICINE CLINIC | Facility: CLINIC | Age: 29
End: 2024-12-20
Payer: COMMERCIAL

## 2024-12-20 VITALS
HEIGHT: 66 IN | OXYGEN SATURATION: 98 % | HEART RATE: 80 BPM | WEIGHT: 187.8 LBS | SYSTOLIC BLOOD PRESSURE: 110 MMHG | BODY MASS INDEX: 30.18 KG/M2 | DIASTOLIC BLOOD PRESSURE: 80 MMHG

## 2024-12-20 DIAGNOSIS — E66.811 CLASS 1 OBESITY WITH BODY MASS INDEX (BMI) OF 30.0 TO 30.9 IN ADULT, UNSPECIFIED OBESITY TYPE, UNSPECIFIED WHETHER SERIOUS COMORBIDITY PRESENT: Primary | ICD-10-CM

## 2024-12-20 DIAGNOSIS — D50.9 IRON DEFICIENCY ANEMIA, UNSPECIFIED IRON DEFICIENCY ANEMIA TYPE: ICD-10-CM

## 2024-12-20 DIAGNOSIS — G47.00 INSOMNIA, UNSPECIFIED TYPE: ICD-10-CM

## 2024-12-20 PROCEDURE — 99214 OFFICE O/P EST MOD 30 MIN: CPT | Performed by: PHYSICIAN ASSISTANT

## 2024-12-20 RX ORDER — TIRZEPATIDE 10 MG/.5ML
10 INJECTION, SOLUTION SUBCUTANEOUS WEEKLY
Qty: 2 ML | Refills: 0 | Status: SHIPPED | OUTPATIENT
Start: 2024-12-20

## 2024-12-20 NOTE — ASSESSMENT & PLAN NOTE
Prior Authorization Clinical Questions for Weight Management Pharmacotherapy    1. Does the patient have a contrainidcation to medication prescribed for weight management?: No  2. Does the patient have a diagnosis of obesity, confirmed by a BMI greater than or equal to 30 kg/m^2?: Yes  3. Does the patient have a BMI of greater than or equal to 27 kg/m^2 with at least one weight-related comorbidity/risk factor/complication (e.g. diabetes, dyslipidemia, coronary artery disease)?: No  7. Does the patient have a history of type 2 diabetes?: No  9. Will the member use requested medication in combination with another GLP agonist or weight loss drug?: No  10. Is the medication a controlled substance?: No  For renewals: Has the patient had a positive outcome with current weight management medication (i.e., change in body weight of at least 4-5% after 12-16 weeks on maximally tolerated dose)?: Yes     Baseline weight (in pounds): 197.6 lbs  Current weight (in pounds): 187.8 lbs  Weight loss percentage: -4.96%       Patient is doing well with Zepbound.  Will continue to titrate upwards with dose.  Prescription sent for 10 mg weekly dose.  She will continue to follow a balanced diet and exercise regularly.  Will recheck in 3 months.  Encouraged to call with any concerns in the interim.  She was asked to continue providing an update after her third dose of each strength so that we can continue to titrate upwards as tolerated.  Orders:    tirzepatide (Zepbound) 10 mg/0.5 mL auto-injector; Inject 0.5 mL (10 mg total) under the skin once a week

## 2024-12-20 NOTE — PROGRESS NOTES
Name: Yun Mancia      : 1995      MRN: [id number]  Encounter Provider: Flor Sheikh PA-C  Encounter Date: 2024   Encounter department: [facility]  :  Assessment & Plan  Class 1 obesity with body mass index (BMI) of 30.0 to 30.9 in adult, unspecified obesity type, unspecified whether serious comorbidity present  Prior Authorization Clinical Questions for Weight Management Pharmacotherapy    1. Does the patient have a contrainidcation to medication prescribed for weight management?: No  2. Does the patient have a diagnosis of obesity, confirmed by a BMI greater than or equal to 30 kg/m^2?: Yes  3. Does the patient have a BMI of greater than or equal to 27 kg/m^2 with at least one weight-related comorbidity/risk factor/complication (e.g. diabetes, dyslipidemia, coronary artery disease)?: No  7. Does the patient have a history of type 2 diabetes?: No  9. Will the member use requested medication in combination with another GLP agonist or weight loss drug?: No  10. Is the medication a controlled substance?: No  For renewals: Has the patient had a positive outcome with current weight management medication (i.e., change in body weight of at least 4-5% after 12-16 weeks on maximally tolerated dose)?: Yes     Baseline weight (in pounds): 197.6 lbs  Current weight (in pounds): 187.8 lbs  Weight loss percentage: -4.96%       Patient is doing well with Zepbound.  Will continue to titrate upwards with dose.  Prescription sent for 10 mg weekly dose.  She will continue to follow a balanced diet and exercise regularly.  Will recheck in 3 months.  Encouraged to call with any concerns in the interim.  She was asked to continue providing an update after her third dose of each strength so that we can continue to titrate upwards as tolerated.  Orders:    tirzepatide (Zepbound) 10 mg/0.5 mL auto-injector; Inject 0.5 mL (10 mg total) under the skin once a week    Iron deficiency anemia,  "unspecified iron deficiency anemia type  Patient will continue to follow with hematology as directed.  She is scheduled for next month with repeat labs prior to visit.       Insomnia, unspecified type  Stable.  Continue trazodone 50 mg at bedtime as needed.              History of Present Illness     ALEJANDRO - Hgb on 9/13/24 was 8.5 - seeing Heme and was getting iron infusions plus B12 injections for a month     Obesity - on Zepbound 7.5 mg weekly - weight since last visit has decreased about 6.5 pounds - denies any side effects    Insomnia - on trazodone 50 mg at bedtime as needed      Review of Systems   Psychiatric/Behavioral:  Positive for sleep disturbance.        Objective   /80   Pulse 80   Ht 5' 6\" (1.676 m)   Wt 85.2 kg (187 lb 12.8 oz)   LMP 12/13/2024   SpO2 98%   Breastfeeding No   BMI 30.31 kg/m²      Physical Exam  Vitals reviewed.   Constitutional:       General: She is not in acute distress.     Appearance: Normal appearance. She is well-developed. She is obese. She is not ill-appearing.   HENT:      Head: Normocephalic and atraumatic.   Neck:      Thyroid: No thyromegaly.   Cardiovascular:      Rate and Rhythm: Normal rate and regular rhythm.      Pulses: Normal pulses.      Heart sounds: Normal heart sounds. No murmur heard.  Pulmonary:      Effort: Pulmonary effort is normal.      Breath sounds: Normal breath sounds. No wheezing, rhonchi or rales.   Musculoskeletal:      Cervical back: Neck supple.   Lymphadenopathy:      Cervical: No cervical adenopathy.   Skin:     General: Skin is warm and dry.   Neurological:      Mental Status: She is alert.   Psychiatric:         Mood and Affect: Mood normal.         Behavior: Behavior normal.         Thought Content: Thought content normal.         Judgment: Judgment normal.         "

## 2024-12-23 ENCOUNTER — TELEPHONE (OUTPATIENT)
Age: 29
End: 2024-12-23

## 2024-12-30 NOTE — ASSESSMENT & PLAN NOTE
Patient will continue to follow with hematology as directed.  She is scheduled for next month with repeat labs prior to visit.

## 2025-01-16 ENCOUNTER — APPOINTMENT (OUTPATIENT)
Dept: LAB | Facility: CLINIC | Age: 30
End: 2025-01-16
Payer: COMMERCIAL

## 2025-01-16 DIAGNOSIS — Z11.59 NEED FOR HEPATITIS C SCREENING TEST: ICD-10-CM

## 2025-01-16 DIAGNOSIS — D50.0 IRON DEFICIENCY ANEMIA DUE TO CHRONIC BLOOD LOSS: ICD-10-CM

## 2025-01-16 DIAGNOSIS — Z11.4 SCREENING FOR HIV (HUMAN IMMUNODEFICIENCY VIRUS): ICD-10-CM

## 2025-01-16 DIAGNOSIS — R73.01 IFG (IMPAIRED FASTING GLUCOSE): ICD-10-CM

## 2025-01-16 LAB
BASOPHILS # BLD AUTO: 0.06 THOUSANDS/ΜL (ref 0–0.1)
BASOPHILS NFR BLD AUTO: 1 % (ref 0–1)
EOSINOPHIL # BLD AUTO: 0.07 THOUSAND/ΜL (ref 0–0.61)
EOSINOPHIL NFR BLD AUTO: 1 % (ref 0–6)
ERYTHROCYTE [DISTWIDTH] IN BLOOD BY AUTOMATED COUNT: 15 % (ref 11.6–15.1)
EST. AVERAGE GLUCOSE BLD GHB EST-MCNC: 105 MG/DL
FERRITIN SERPL-MCNC: 22 NG/ML (ref 11–307)
HBA1C MFR BLD: 5.3 %
HCT VFR BLD AUTO: 37.1 % (ref 34.8–46.1)
HGB BLD-MCNC: 11.6 G/DL (ref 11.5–15.4)
IMM GRANULOCYTES # BLD AUTO: 0.02 THOUSAND/UL (ref 0–0.2)
IMM GRANULOCYTES NFR BLD AUTO: 0 % (ref 0–2)
LYMPHOCYTES # BLD AUTO: 2.83 THOUSANDS/ΜL (ref 0.6–4.47)
LYMPHOCYTES NFR BLD AUTO: 42 % (ref 14–44)
MCH RBC QN AUTO: 28.1 PG (ref 26.8–34.3)
MCHC RBC AUTO-ENTMCNC: 31.3 G/DL (ref 31.4–37.4)
MCV RBC AUTO: 90 FL (ref 82–98)
MONOCYTES # BLD AUTO: 0.44 THOUSAND/ΜL (ref 0.17–1.22)
MONOCYTES NFR BLD AUTO: 7 % (ref 4–12)
NEUTROPHILS # BLD AUTO: 3.37 THOUSANDS/ΜL (ref 1.85–7.62)
NEUTS SEG NFR BLD AUTO: 49 % (ref 43–75)
NRBC BLD AUTO-RTO: 0 /100 WBCS
PLATELET # BLD AUTO: 430 THOUSANDS/UL (ref 149–390)
PMV BLD AUTO: 9.3 FL (ref 8.9–12.7)
RBC # BLD AUTO: 4.13 MILLION/UL (ref 3.81–5.12)
WBC # BLD AUTO: 6.79 THOUSAND/UL (ref 4.31–10.16)

## 2025-01-16 PROCEDURE — 86803 HEPATITIS C AB TEST: CPT

## 2025-01-16 PROCEDURE — 83550 IRON BINDING TEST: CPT

## 2025-01-16 PROCEDURE — 36415 COLL VENOUS BLD VENIPUNCTURE: CPT

## 2025-01-16 PROCEDURE — 87389 HIV-1 AG W/HIV-1&-2 AB AG IA: CPT

## 2025-01-16 PROCEDURE — 83918 ORGANIC ACIDS TOTAL QUANT: CPT

## 2025-01-16 PROCEDURE — 80053 COMPREHEN METABOLIC PANEL: CPT

## 2025-01-16 PROCEDURE — 85025 COMPLETE CBC W/AUTO DIFF WBC: CPT

## 2025-01-16 PROCEDURE — 82728 ASSAY OF FERRITIN: CPT

## 2025-01-16 PROCEDURE — 83540 ASSAY OF IRON: CPT

## 2025-01-17 ENCOUNTER — RESULTS FOLLOW-UP (OUTPATIENT)
Dept: FAMILY MEDICINE CLINIC | Facility: CLINIC | Age: 30
End: 2025-01-17

## 2025-01-17 ENCOUNTER — TELEPHONE (OUTPATIENT)
Age: 30
End: 2025-01-17

## 2025-01-17 LAB
ALBUMIN SERPL BCG-MCNC: 4.3 G/DL (ref 3.5–5)
ALP SERPL-CCNC: 38 U/L (ref 34–104)
ALT SERPL W P-5'-P-CCNC: 8 U/L (ref 7–52)
ANION GAP SERPL CALCULATED.3IONS-SCNC: 9 MMOL/L (ref 4–13)
AST SERPL W P-5'-P-CCNC: 11 U/L (ref 13–39)
BILIRUB SERPL-MCNC: 0.29 MG/DL (ref 0.2–1)
BUN SERPL-MCNC: 11 MG/DL (ref 5–25)
CALCIUM SERPL-MCNC: 8.3 MG/DL (ref 8.4–10.2)
CHLORIDE SERPL-SCNC: 103 MMOL/L (ref 96–108)
CO2 SERPL-SCNC: 25 MMOL/L (ref 21–32)
CREAT SERPL-MCNC: 0.67 MG/DL (ref 0.6–1.3)
GFR SERPL CREATININE-BSD FRML MDRD: 120 ML/MIN/1.73SQ M
GLUCOSE P FAST SERPL-MCNC: 78 MG/DL (ref 65–99)
HCV AB SER QL: NORMAL
HIV 1+2 AB+HIV1 P24 AG SERPL QL IA: NORMAL
HIV 2 AB SERPL QL IA: NORMAL
HIV1 AB SERPL QL IA: NORMAL
HIV1 P24 AG SERPL QL IA: NORMAL
IRON SATN MFR SERPL: 17 % (ref 15–50)
IRON SERPL-MCNC: 45 UG/DL (ref 50–212)
POTASSIUM SERPL-SCNC: 4.1 MMOL/L (ref 3.5–5.3)
PROT SERPL-MCNC: 7.2 G/DL (ref 6.4–8.4)
SODIUM SERPL-SCNC: 137 MMOL/L (ref 135–147)
TIBC SERPL-MCNC: 261.8 UG/DL (ref 250–450)
TRANSFERRIN SERPL-MCNC: 187 MG/DL (ref 203–362)
UIBC SERPL-MCNC: 217 UG/DL (ref 155–355)

## 2025-01-17 NOTE — TELEPHONE ENCOUNTER
PA for Zepbound 12.5mg/0.5mL SUBMITTED to [employer]    via    []CMM-KEY:   [x]Surescripts  []Availity-Auth ID # NDC #   []Faxed to plan   []Other website   []Phone call Case ID #     [x]PA sent as URGENT    All office notes, labs and other pertaining documents and studies sent. Clinical questions answered. Awaiting determination from insurance company.     Turnaround time for your insurance to make a decision on your Prior Authorization can take 7-21 business days.

## 2025-01-17 NOTE — TELEPHONE ENCOUNTER
PA for Zepbound 12.5mg/0.5mL SUBMITTED to [employer]    via    []CMM-KEY:   [x]Surescripts-Case ID # [id number]   []Availity-Auth ID # NDC #   []Faxed to plan   []Other website   []Phone call Case ID #     [x]PA sent as URGENT    All office notes, labs and other pertaining documents and studies sent. Clinical questions answered. Awaiting determination from insurance company.     Turnaround time for your insurance to make a decision on your Prior Authorization can take 7-21 business days.

## 2025-01-20 LAB — METHYLMALONATE SERPL-SCNC: 81 NMOL/L (ref 0–378)

## 2025-01-20 NOTE — TELEPHONE ENCOUNTER
PA for Zepbound 12.5mg/0.5mL CANCELLED     Due to     [x]Approval on file-dates approved 10/04/2024-04/04/2025  []Medication already on Formulary  []Brand Name Preferred  []Patient no longer covered by insurance        Scanned into Media  no

## 2025-01-21 DIAGNOSIS — E66.811 CLASS 1 OBESITY WITH BODY MASS INDEX (BMI) OF 31.0 TO 31.9 IN ADULT, UNSPECIFIED OBESITY TYPE, UNSPECIFIED WHETHER SERIOUS COMORBIDITY PRESENT: ICD-10-CM

## 2025-01-21 DIAGNOSIS — D50.9 IRON DEFICIENCY ANEMIA, UNSPECIFIED IRON DEFICIENCY ANEMIA TYPE: Primary | ICD-10-CM

## 2025-02-11 ENCOUNTER — TELEPHONE (OUTPATIENT)
Dept: OBGYN CLINIC | Facility: CLINIC | Age: 30
End: 2025-02-11

## 2025-02-14 ENCOUNTER — TELEPHONE (OUTPATIENT)
Age: 30
End: 2025-02-14

## 2025-02-14 NOTE — TELEPHONE ENCOUNTER
Called and left a message regarding missed appt today with Vicky Hood which patient rescheduled via [email or web address] for today.  Left the [employer] number should patient want to reschedule

## 2025-03-11 ENCOUNTER — TELEPHONE (OUTPATIENT)
Dept: HEMATOLOGY ONCOLOGY | Facility: CLINIC | Age: 30
End: 2025-03-11

## 2025-03-11 NOTE — TELEPHONE ENCOUNTER
Left message for Faby inquiring if she had any more current labs done. The last ones done were in January. Provided call back number [phone]

## 2025-03-13 ENCOUNTER — OFFICE VISIT (OUTPATIENT)
Age: 30
End: 2025-03-13
Payer: COMMERCIAL

## 2025-03-13 VITALS
BODY MASS INDEX: 27 KG/M2 | RESPIRATION RATE: 18 BRPM | HEART RATE: 84 BPM | DIASTOLIC BLOOD PRESSURE: 98 MMHG | HEIGHT: 66 IN | WEIGHT: 168 LBS | SYSTOLIC BLOOD PRESSURE: 139 MMHG | TEMPERATURE: 98.1 F | OXYGEN SATURATION: 100 %

## 2025-03-13 DIAGNOSIS — D50.9 IRON DEFICIENCY ANEMIA, UNSPECIFIED IRON DEFICIENCY ANEMIA TYPE: Primary | ICD-10-CM

## 2025-03-13 PROCEDURE — 99213 OFFICE O/P EST LOW 20 MIN: CPT | Performed by: NURSE PRACTITIONER

## 2025-03-13 NOTE — PROGRESS NOTES
Name: Yun Mancia      : 1995      MRN: [id number]  Encounter Provider: BRITNEY Plummer  Encounter Date: 3/13/2025   Encounter department: [facility]  :  Assessment & Plan  Iron deficiency anemia, unspecified iron deficiency anemia type  Patient has a longstanding history of severe iron deficiency anemia in the setting of menorrhagia.  She was treated with a course of parenteral iron replacement which greatly improved her numbers.  She is no longer anemic and iron stores have greatly improved.  We did discuss additional parenteral iron replacement versus daily iron supplementation with blood builder and she would like to trial daily supplementation.  I will repeat blood work in a few months after taking daily supplementation to see how this is working for her.  I will call her with results.  She is hoping to become a surrogate and the goal is to get her hemoglobin closer to 13    She is aware to call anytime with questions or concerns      Orders:    CBC and differential; Standing    Iron Panel (Includes Ferritin, Iron Sat%, Iron, and TIBC); Standing        Return in about 3 months (around 2025) for [facility], bloodwork.    History of Present Illness   Chief Complaint   Patient presents with    Follow-up     Pertinent Medical History     25: Patient presents for follow-up for her history of iron deficiency anemia.  She completed a course of IV Venofer on 10/17/2024.      Blood work completed on 2025 shows great improvement in her numbers.  Her anemia resolved with parenteral iron replacement and her serum ferritin improved from a low of 3-22  Symptoms greatly improved.  No longer experiencing significant menorrhagia.  She is trying to become a surrogate.  We discussed taking daily oral iron supplementation with blood builder to help continue to improve her iron stores       Review of Systems   All other systems reviewed and are  "negative.    Medical History Reviewed by provider this encounter:     .      Objective   /98 (BP Location: Left arm, Patient Position: Sitting, Cuff Size: Standard)   Pulse 84   Temp 98.1 °F (36.7 °C) (Temporal)   Resp 18   Ht 5' 6\" (1.676 m)   Wt 76.2 kg (168 lb)   SpO2 100%   BMI 27.12 kg/m²     Physical Exam  Constitutional:       General: She is not in acute distress.     Appearance: Normal appearance.   HENT:      Head: Normocephalic and atraumatic.   Eyes:      General: No scleral icterus.        Right eye: No discharge.         Left eye: No discharge.      Conjunctiva/sclera: Conjunctivae normal.   Cardiovascular:      Rate and Rhythm: Normal rate and regular rhythm.   Pulmonary:      Effort: Pulmonary effort is normal. No respiratory distress.      Breath sounds: Normal breath sounds.   Abdominal:      General: Bowel sounds are normal. There is no distension.      Palpations: Abdomen is soft. There is no mass.      Tenderness: There is no abdominal tenderness.   Musculoskeletal:         General: Normal range of motion.   Lymphadenopathy:      Cervical: No cervical adenopathy.      Upper Body:      Right upper body: No supraclavicular, axillary or pectoral adenopathy.      Left upper body: No supraclavicular, axillary or pectoral adenopathy.   Skin:     General: Skin is warm and dry.   Neurological:      General: No focal deficit present.      Mental Status: She is alert and oriented to person, place, and time.   Psychiatric:         Mood and Affect: Mood normal.         Behavior: Behavior normal.         Labs: I have reviewed the following labs:  Results for orders placed or performed in visit on 01/16/25   CBC and differential   Result Value Ref Range    WBC 6.79 4.31 - 10.16 Thousand/uL    RBC 4.13 3.81 - 5.12 Million/uL    Hemoglobin 11.6 11.5 - 15.4 g/dL    Hematocrit 37.1 34.8 - 46.1 %    MCV 90 82 - 98 fL    MCH 28.1 26.8 - 34.3 pg    MCHC 31.3 (L) 31.4 - 37.4 g/dL    RDW 15.0 11.6 - 15.1 " %    MPV 9.3 8.9 - 12.7 fL    Platelets 430 (H) 149 - 390 Thousands/uL    nRBC 0 /100 WBCs    Segmented % 49 43 - 75 %    Immature Grans % 0 0 - 2 %    Lymphocytes % 42 14 - 44 %    Monocytes % 7 4 - 12 %    Eosinophils Relative 1 0 - 6 %    Basophils Relative 1 0 - 1 %    Absolute Neutrophils 3.37 1.85 - 7.62 Thousands/µL    Absolute Immature Grans 0.02 0.00 - 0.20 Thousand/uL    Absolute Lymphocytes 2.83 0.60 - 4.47 Thousands/µL    Absolute Monocytes 0.44 0.17 - 1.22 Thousand/µL    Eosinophils Absolute 0.07 0.00 - 0.61 Thousand/µL    Basophils Absolute 0.06 0.00 - 0.10 Thousands/µL   Comprehensive metabolic panel   Result Value Ref Range    Sodium 137 135 - 147 mmol/L    Potassium 4.1 3.5 - 5.3 mmol/L    Chloride 103 96 - 108 mmol/L    CO2 25 21 - 32 mmol/L    ANION GAP 9 4 - 13 mmol/L    BUN 11 5 - 25 mg/dL    Creatinine 0.67 0.60 - 1.30 mg/dL    Glucose, Fasting 78 65 - 99 mg/dL    Calcium 8.3 (L) 8.4 - 10.2 mg/dL    AST 11 (L) 13 - 39 U/L    ALT 8 7 - 52 U/L    Alkaline Phosphatase 38 34 - 104 U/L    Total Protein 7.2 6.4 - 8.4 g/dL    Albumin 4.3 3.5 - 5.0 g/dL    Total Bilirubin 0.29 0.20 - 1.00 mg/dL    eGFR 120 ml/min/1.73sq m   Methylmalonic acid, serum   Result Value Ref Range    Methylmalonic Acid, S 81 0 - 378 nmol/L   HIV 1/2 AG/AB w Reflex [facility] for 2 yr old and above   Result Value Ref Range    HIV-1 p24 Antigen Non-Reactive Non-Reactive    HIV-1 Antibody Non-Reactive Non-Reactive    HIV-2 Antibody Non-Reactive Non-Reactive    HIV Ag-Ab 5th Gen Non-Reactive Non-Reactive   Hepatitis C antibody   Result Value Ref Range    Hepatitis C Ab Non-reactive Non-Reactive   TIBC Panel (incl. Iron, TIBC, % Iron Saturation)   Result Value Ref Range    Iron Saturation 17 15 - 50 %    TIBC 261.8 250 - 450 ug/dL    Iron 45 (L) 50 - 212 ug/dL    Transferrin 187 (L) 203 - 362 mg/dL    UIBC 217 155 - 355 ug/dL   Result Value Ref Range    Ferritin 22 11 - 307 ng/mL           Administrative Statements   I have spent a  total time of 20 minutes in caring for this patient on the day of the visit/encounter including Instructions for management, Impressions, Documenting in the medical record, Reviewing/placing orders in the medical record (including tests, medications, and/or procedures), and Obtaining or reviewing history  .

## 2025-03-13 NOTE — ASSESSMENT & PLAN NOTE
Patient has a longstanding history of severe iron deficiency anemia in the setting of menorrhagia.  She was treated with a course of parenteral iron replacement which greatly improved her numbers.  She is no longer anemic and iron stores have greatly improved.  We did discuss additional parenteral iron replacement versus daily iron supplementation with blood builder and she would like to trial daily supplementation.  I will repeat blood work in a few months after taking daily supplementation to see how this is working for her.  I will call her with results.  She is hoping to become a surrogate and the goal is to get her hemoglobin closer to 13    She is aware to call anytime with questions or concerns      Orders:    CBC and differential; Standing    Iron Panel (Includes Ferritin, Iron Sat%, Iron, and TIBC); Standing

## 2025-03-25 DIAGNOSIS — E66.811 CLASS 1 OBESITY WITH BODY MASS INDEX (BMI) OF 30.0 TO 30.9 IN ADULT, UNSPECIFIED OBESITY TYPE, UNSPECIFIED WHETHER SERIOUS COMORBIDITY PRESENT: ICD-10-CM

## 2025-03-26 RX ORDER — TIRZEPATIDE 15 MG/.5ML
15 INJECTION, SOLUTION SUBCUTANEOUS WEEKLY
Qty: 2 ML | Refills: 0 | Status: SHIPPED | OUTPATIENT
Start: 2025-03-26

## 2025-04-07 ENCOUNTER — TELEPHONE (OUTPATIENT)
Dept: FAMILY MEDICINE CLINIC | Facility: CLINIC | Age: 30
End: 2025-04-07

## 2025-04-07 NOTE — TELEPHONE ENCOUNTER
Tired to reach pt to advise her that she missed her appt today with Ling. Her mailbox is full and I was not able to leacve a message.

## 2025-05-14 ENCOUNTER — PATIENT MESSAGE (OUTPATIENT)
Dept: OBGYN CLINIC | Facility: CLINIC | Age: 30
End: 2025-05-14

## 2025-05-15 NOTE — TELEPHONE ENCOUNTER
I would advise sending to physician or having her meet with maternal fetal medicine. I have only seen her for vaginitis concerns. It does not appear that we have delivered her previous children and do not have a lot of information regarding her obestetrical history. Also, appears to have elevated blood pressure and iron deficiency anemia, which can be worsened in pregnancy.

## 2025-05-16 ENCOUNTER — TELEPHONE (OUTPATIENT)
Age: 30
End: 2025-05-16

## 2025-05-16 NOTE — TELEPHONE ENCOUNTER
Patient called to check on the status of her recent message, informed patient it was sent to provider and someone will reach out to patient to discuss. Informed patient she may need a referral from her OBGYN.

## 2025-05-16 NOTE — TELEPHONE ENCOUNTER
Pt called in, was advised by her OB to be evaluated by MFM in order to fill out papers in becoming a surrogate. Pls reach out to pt if this can be scheduled

## 2025-05-19 ENCOUNTER — OFFICE VISIT (OUTPATIENT)
Dept: FAMILY MEDICINE CLINIC | Facility: CLINIC | Age: 30
End: 2025-05-19
Payer: COMMERCIAL

## 2025-05-19 VITALS
HEART RATE: 84 BPM | WEIGHT: 167.2 LBS | DIASTOLIC BLOOD PRESSURE: 80 MMHG | BODY MASS INDEX: 26.99 KG/M2 | TEMPERATURE: 95.9 F | OXYGEN SATURATION: 99 % | SYSTOLIC BLOOD PRESSURE: 114 MMHG

## 2025-05-19 DIAGNOSIS — E66.811 CLASS 1 OBESITY WITH BODY MASS INDEX (BMI) OF 30.0 TO 30.9 IN ADULT, UNSPECIFIED OBESITY TYPE, UNSPECIFIED WHETHER SERIOUS COMORBIDITY PRESENT: Primary | ICD-10-CM

## 2025-05-19 DIAGNOSIS — G47.00 INSOMNIA, UNSPECIFIED TYPE: ICD-10-CM

## 2025-05-19 DIAGNOSIS — L68.0 HIRSUTISM: ICD-10-CM

## 2025-05-19 DIAGNOSIS — N92.6 MENSTRUAL CHANGES: ICD-10-CM

## 2025-05-19 DIAGNOSIS — D50.9 IRON DEFICIENCY ANEMIA, UNSPECIFIED IRON DEFICIENCY ANEMIA TYPE: ICD-10-CM

## 2025-05-19 DIAGNOSIS — L70.9 ACNE, UNSPECIFIED ACNE TYPE: ICD-10-CM

## 2025-05-19 PROCEDURE — 99214 OFFICE O/P EST MOD 30 MIN: CPT | Performed by: PHYSICIAN ASSISTANT

## 2025-05-19 RX ORDER — TIRZEPATIDE 15 MG/.5ML
15 INJECTION, SOLUTION SUBCUTANEOUS WEEKLY
Qty: 2 ML | Refills: 0 | Status: SHIPPED | OUTPATIENT
Start: 2025-05-19

## 2025-05-19 RX ORDER — TRAZODONE HYDROCHLORIDE 50 MG/1
50 TABLET ORAL
Qty: 30 TABLET | Refills: 5 | Status: SHIPPED | OUTPATIENT
Start: 2025-05-19

## 2025-05-19 NOTE — PROGRESS NOTES
Name: Yun Mancia      : 1995      MRN: [id number]  Encounter Provider: Flor Sheikh PA-C  Encounter Date: 2025   Encounter department: [facility]    :  Assessment & Plan  Class 1 obesity with body mass index (BMI) of 30.0 to 30.9 in adult, unspecified obesity type, unspecified whether serious comorbidity present  Prior Authorization Clinical Questions for Weight Management Pharmacotherapy    1. Does the patient have a contrainidcation to medication prescribed for weight management?: No  2. Does the patient have a diagnosis of obesity, confirmed by a BMI greater than or equal to 30 kg/m^2?: No  3. Does the patient have a BMI of greater than or equal to 27 kg/m^2 with at least one weight-related comorbidity/risk factor/complication (e.g. diabetes, dyslipidemia, coronary artery disease)?: No  9. Does the patient have a history of type 2 diabetes?: No  11. Will the member use requested medication in combination with another GLP agonist or weight loss drug?: No  12. Is the medication a controlled substance?: No  For renewals: Has the patient had a positive outcome with current weight management medication (i.e., change in body weight of at least 4-5% after 12-16 weeks on maximally tolerated dose)?: Yes     Baseline weight (in pounds): 197.6 lbs  Current weight (in pounds): 167.2 lbs  Weight loss percentage: -15.38%       - She has been on Zepbound 15 mg weekly and has lost over 20 pounds since 2024.  - Her weight has been stable recently, though she reports a slight discrepancy between her home measurements and the clinic's scale.  - She was advised to continue her current regimen and incorporate strength training into her exercise routine to prevent muscle mass loss.  - A refill for Zepbound was sent to the pharmacy.  - She is considering surrogacy and understands that she will need to discontinue Zepbound for at least two months before starting the process.  - She will  notify the office when she begins the medical clearance process for surrogacy.  - The timeline and requirements for discontinuing Zepbound were reviewed.  - Coordination with the surrogacy program and medical clearance were discussed.  Orders:    tirzepatide (Zepbound) 15 mg/0.5 mL auto-injector; Inject 0.5 mL (15 mg total) under the skin once a week    Insomnia, unspecified type  - She has been using trazodone intermittently for sleep and requested a refill.  - A prescription for trazodone was sent to the pharmacy.  - She was advised to take trazodone as needed and to avoid daily use to prevent tolerance.  - Effectiveness of trazodone was confirmed, and she will continue using it as needed.  Orders:    traZODone (DESYREL) 50 mg tablet; Take 1 tablet (50 mg total) by mouth daily at bedtime as needed for sleep    Hirsutism  - She reported increased hair growth on her chin and chest.  - It was discussed that this could be due to higher testosterone levels rather than PCOS.  - A referral to dermatology was made for further evaluation and potential treatment options.  - The possibility of hormonal testing was discussed, and she was advised to consider seeing a dermatologist for further assessment.  Orders:    Ambulatory Referral to Dermatology; Future    Testosterone, free, total; Future    DHEA-sulfate; Future    17-Hydroxyprogesterone; Future    Sex Hormone Binding Globulin; Future    Androstenedione; Future    Acne, unspecified acne type  - The patient has been experiencing breakouts on her cheeks for about a month.  - She has been using black soap with aloe, peppermint oil, and tea tree oil, which should be beneficial.  - It was discussed that starting a new product can sometimes aggravate the skin initially, and it takes time to see improvement. If the current regimen does not work, a prescription for a topical antibiotic can be considered.  - She was advised to continue using the black soap and monitor her skin  for a month. If there is no improvement, she should send a MyChart message to discuss further treatment options.       Menstrual changes  - She reported lighter menstrual cycles over the past few months, which may correlate with her weight loss.  - This change was noted in the last three to four cycles.  - The correlation between weight loss and lighter menstrual cycles was discussed.  - Monitoring of menstrual cycles will continue.       Iron deficiency anemia, unspecified iron deficiency anemia type  - She has been working with hematology on her iron levels and is currently taking oral iron supplements, including a blood builder and a liquid form.  - She reported feeling great and has completed recent lab work.  - The effectiveness of the iron supplements was discussed.  - Continued monitoring of iron levels and hematology follow-up were advised.             Assessment & Plan  1. Facial acne.  - The patient has been experiencing breakouts on her cheeks for about a month.  - She has been using black soap with aloe, peppermint oil, and tea tree oil, which should be beneficial.  - It was discussed that starting a new product can sometimes aggravate the skin initially, and it takes time to see improvement. If the current regimen does not work, a prescription for a topical antibiotic can be considered.  - She was advised to continue using the black soap and monitor her skin for a month. If there is no improvement, she should send a MyChart message to discuss further treatment options.    2. Weight management.  - She has been on Zepbound 15 mg weekly and has lost over 20 pounds since 12/2024.  - Her weight has been stable recently, though she reports a slight discrepancy between her home measurements and the clinic's scale.  - She was advised to continue her current regimen and incorporate strength training into her exercise routine to prevent muscle mass loss.  - A refill for Zepbound was sent to the pharmacy.    3.  Menstrual changes.  - She reported lighter menstrual cycles over the past few months, which may correlate with her weight loss.  - This change was noted in the last three to four cycles.  - The correlation between weight loss and lighter menstrual cycles was discussed.  - Monitoring of menstrual cycles will continue.    4. Sleep disturbances.  - She has been using trazodone intermittently for sleep and requested a refill.  - A prescription for trazodone was sent to the pharmacy.  - She was advised to take trazodone as needed and to avoid daily use to prevent tolerance.  - Effectiveness of trazodone was confirmed, and she will continue using it as needed.    5. Iron deficiency.  - She has been working with hematology on her iron levels and is currently taking oral iron supplements, including a blood builder and a liquid form.  - She reported feeling great and has completed recent lab work.  - The effectiveness of the iron supplements was discussed.  - Continued monitoring of iron levels and hematology follow-up were advised.    6. Surrogacy considerations.  - She is considering surrogacy and understands that she will need to discontinue Zepbound for at least two months before starting the process.  - She will notify the office when she begins the medical clearance process for surrogacy.  - The timeline and requirements for discontinuing Zepbound were reviewed.  - Coordination with the surrogacy program and medical clearance were discussed.    7. Hirsutism.  - She reported increased hair growth on her chin and chest.  - It was discussed that this could be due to higher testosterone levels rather than PCOS.  - A referral to dermatology was made for further evaluation and potential treatment options.  - The possibility of hormonal testing was discussed, and she was advised to consider seeing a dermatologist for further assessment.    Follow-up  The patient will follow up in approximately 3 months.           History of  Present Illness     History of Present Illness  The patient presents for a follow-up on weight management with Zepbound and to discuss concerns regarding facial skin breakouts.    She has been experiencing intermittent breakouts on her cheeks for the past month, with no prior history of such skin issues. The severity of the breakouts fluctuates, sometimes improving and other times worsening upon waking. She has been using [employer] skincare line, which includes a toner with folic acid, caffeine solution for the eyes, and black soap with aloe, mode, peppermint oil, and tea tree oil. She cleanses her face three times daily due to her work schedule. She is curious if the Zepbound could be contributing to her skin issues, but it is considered unlikely as she has been on the medication for a while without previous skin issues.    She reports a decrease in menstrual flow over the past 3 to 4 cycles, which she attributes to recent weight loss. Her weight has remained stable at 62 kg, as per her home measurements, although it was recorded as 67 kg in the clinic today. She has been monitoring her weight at home and notes a gradual decrease. She used to go to the gym but stopped when it got cold. She plans to resume her gym routine as the weather warms up. Her workouts typically include starting with the treadmill for walking, followed by weight machines for strength training, focusing on legs and other areas. She describes herself as very active, both at her job and at home, where she moves around frequently and avoids prolonged sitting. She is currently on a regimen of Zepbound 15 mg weekly.    She has been using trazodone intermittently for sleep, which she finds effective. However, she has not refilled her prescription recently.    She has been under the care of a hematologist for iron level management. She has completed iron infusions and is currently taking oral iron supplements, including a blood builder and a  liquid form. She takes the pills at night and the liquid after eating in the morning. She reports feeling great overall.    She is considering surrogacy and is seeking medical clearance. She has been informed that she will need to discontinue Zepbound for at least 2 months prior to the procedure.    She has noticed an increase in chin hair growth, which she manages by shaving. She has not yet started any medications for this issue.    GYNECOLOGICAL HISTORY:  - Frequency and Flow: Decreased flow over the past 3 to 4 cycles     Review of Systems   Constitutional:  Negative for fatigue.   Skin:  Positive for rash (as in HPI).     Objective   /80 (BP Location: Right arm, Patient Position: Sitting, Cuff Size: Large)   Pulse 84   Temp (!) 95.9 °F (35.5 °C) (Tympanic)   Wt 75.8 kg (167 lb 3.2 oz)   SpO2 99%   BMI 26.99 kg/m²     Physical Exam  Respiratory: Clear to auscultation, no wheezing, rales or rhonchi  Physical Exam  Vitals reviewed.   Constitutional:       General: She is not in acute distress.     Appearance: Normal appearance. She is well-developed. She is not ill-appearing.   HENT:      Head: Normocephalic and atraumatic.   Neck:      Thyroid: No thyromegaly.      Vascular: No carotid bruit.     Cardiovascular:      Rate and Rhythm: Normal rate and regular rhythm.      Pulses: Normal pulses.      Heart sounds: Normal heart sounds. No murmur heard.  Pulmonary:      Effort: Pulmonary effort is normal.      Breath sounds: Normal breath sounds. No wheezing, rhonchi or rales.     Musculoskeletal:      Cervical back: Neck supple.      Right lower leg: No edema.      Left lower leg: No edema.   Lymphadenopathy:      Cervical: No cervical adenopathy.     Skin:     General: Skin is warm and dry.      Findings: Rash (clusters of papules and pustules on cheeks bilaterally) present.     Neurological:      Mental Status: She is alert.     Psychiatric:         Mood and Affect: Mood normal.         Behavior:  Behavior normal.         Thought Content: Thought content normal.         Judgment: Judgment normal.

## 2025-05-19 NOTE — ASSESSMENT & PLAN NOTE
- She has been using trazodone intermittently for sleep and requested a refill.  - A prescription for trazodone was sent to the pharmacy.  - She was advised to take trazodone as needed and to avoid daily use to prevent tolerance.  - Effectiveness of trazodone was confirmed, and she will continue using it as needed.  Orders:    traZODone (DESYREL) 50 mg tablet; Take 1 tablet (50 mg total) by mouth daily at bedtime as needed for sleep

## 2025-05-20 NOTE — TELEPHONE ENCOUNTER
I spoke with patient no ref in patient's chart , no appts available for preconception consults x 1 yr, Patient states she will call [facility] to see if there are any appts available

## 2025-05-21 ENCOUNTER — TELEPHONE (OUTPATIENT)
Age: 30
End: 2025-05-21

## 2025-05-21 NOTE — TELEPHONE ENCOUNTER
Please be advised that the prior auth must be done with  patients primary which is [employer]. It appears that the prior auth was done through [employer] which is her secondary ins.      Thank you.

## 2025-05-21 NOTE — TELEPHONE ENCOUNTER
PA for tirzepatide (Zepbound) 15 mg/0.5 mL auto-injector SUBMITTED to [facility]    via    [x]CMM-KEY:  [id number]  []Surescripts-Case ID #    []Availity-Auth ID #  NDC #    []Faxed to plan   []Other website    []Phone call Case ID #      [x]PA sent as URGENT    All office notes, labs and other pertaining documents and studies sent. Clinical questions answered. Awaiting determination from insurance company.     Turnaround time for your insurance to make a decision on your Prior Authorization can take 7-21 business days.

## 2025-05-21 NOTE — ASSESSMENT & PLAN NOTE
- She has been working with hematology on her iron levels and is currently taking oral iron supplements, including a blood builder and a liquid form.  - She reported feeling great and has completed recent lab work.  - The effectiveness of the iron supplements was discussed.  - Continued monitoring of iron levels and hematology follow-up were advised.

## 2025-05-22 NOTE — TELEPHONE ENCOUNTER
This medication is excluded from her primary insurance [employer]. Please see the encounter dated 1/17/2025 in the patient's chart. Thank you.

## 2025-05-22 NOTE — TELEPHONE ENCOUNTER
I called [facility] pharmacy and spoke to the pharmacist. He stated to me that pt no longer has blue cross and then it was sent to [facility] who thinks pt still has [employer]. I then called the pt to advise her that she needs to reach out to [facility] to let them know she no longer has [employer], and that this is what the hold up is.

## 2025-05-23 NOTE — TELEPHONE ENCOUNTER
Patient returned call to advise that she reached out to [employer] and they have deleted [employer] as primary insurance.and to please continue w/prior auth for Zepbound.

## 2025-05-23 NOTE — TELEPHONE ENCOUNTER
PA for tirzepatide (Zepbound) 15 mg/0.5 mL auto-injector RESUBMITTED to [facility]    via    [x]CMM-KEY:   [id number]  []Surescripts-Case ID #    []Availity-Auth ID #  NDC #    []Faxed to plan   []Other website    []Phone call Case ID #      [x]PA sent as URGENT    All office notes, labs and other pertaining documents and studies sent. Clinical questions answered. Awaiting determination from insurance company.     Turnaround time for your insurance to make a decision on your Prior Authorization can take 7-21 business days.

## 2025-06-12 ENCOUNTER — TELEPHONE (OUTPATIENT)
Age: 30
End: 2025-06-12

## 2025-06-12 NOTE — TELEPHONE ENCOUNTER
Rec'd call from patient requesting to schedule as NP. Routine referral in chart. Offered next available (1/8/26). Patient declined scheduling at this time.

## 2025-06-12 NOTE — TELEPHONE ENCOUNTER
Patient called in stating she never received a from dermatology upon review discussed with patient and gave her [phone]  Patient had no further questions

## 2025-06-19 ENCOUNTER — ANNUAL EXAM (OUTPATIENT)
Dept: OBGYN CLINIC | Facility: CLINIC | Age: 30
End: 2025-06-19

## 2025-06-19 VITALS — DIASTOLIC BLOOD PRESSURE: 86 MMHG | WEIGHT: 166 LBS | BODY MASS INDEX: 26.79 KG/M2 | SYSTOLIC BLOOD PRESSURE: 122 MMHG

## 2025-06-19 DIAGNOSIS — Z01.419 ENCOUNTER FOR GYNECOLOGICAL EXAMINATION WITHOUT ABNORMAL FINDING: Primary | ICD-10-CM

## 2025-06-19 NOTE — PROGRESS NOTES
Name: Yun Mancia      : 1995      MRN: [id number]  Encounter Provider: Osmar Ray PA-C  Encounter Date: 2025   Encounter department: [facility] [place]  :  Assessment & Plan  Encounter for gynecological examination without abnormal finding  - Routine well woman exam done today.  - Cervical Cancer Screening: Pap up to date..  Current ASCCP Guidelines reviewed.    - Gardasil: Reviewed recommended for patients from 9-45 years of age. Pt has not had the vaccine and is undecided at this time.  - Contraception: Abstinence   - STD testing: Patient declines STD testing today.  - Return to office in 1 year for annual.  - Form completed for Egg Donation and Surrogacy.  Normal exam today.  Recommend she see her primary care doctor for completion of non gynecology health.    All questions answered to the best of my ability.             History of Present Illness   HPI  30 y.o. Yun Mancia  presents for annual exam.    Pt has a [employer], Egg Donation and Surrogacy form to be completed today.  She states she already had an appointment with maternal fetal medicine.    Patient's last menstrual period was 06/10/2025 (within days).    Gynecology  Menses prior to any hormonal birth control method:  - Frequency: monthly  - Bleedin-6 days, heaviest day changes q pads with tampons 2 hours    - Cramping: severe x 2 days  (Prior US reviewed 07/15/2024. No fibroids or adenomyosis noted)    - Sexually Active: no at this time    - Birth Control: Condoms    - STD screening desired: declines     - pap  Lab Results   Component Value Date    PRIMINTERP Negative for intraepithelial lesion or malignancy 2024    INTERPGYN Shift in anselmo suggestive of bacterial vaginosis 2024    SPECADGYN  2024     Satisfactory for evaluation. Endocervical/transformation zone component present.       - Hx of abnormal paps: No    - Hx of gynecology surgeries: Yes, describe:      - Gardasil Vaccine: no and desires vaccine   She will read into information regarding vaccine.    Family history of any of the following cancers:  - Colon: No  - Breast: No  - Ovarian: No  - Uterine: No  - Pancreas: No  - Melanoma: No    Obstetric History   OB History    Para Term  AB Living   4 3 3 0 1 3   SAB IAB Ectopic Multiple Live Births   1 0 0 0 3      # Outcome Date GA Lbr Jesús/2nd Weight Sex Type Anes PTL Lv   4 Term 21 39w5d  2890 g (6 lb 5.9 oz) F Vag-Spont   REAL   3 Term      Vag-Spont   REAL   2 Term      CS-LTranv   REAL   1 SAB                Problem List[1]    Review of Systems   Constitutional:  Negative for activity change.   Gastrointestinal:  Negative for abdominal distention, anal bleeding, blood in stool, constipation, diarrhea and vomiting.   Genitourinary:  Negative for difficulty urinating, dyspareunia, dysuria, frequency, hematuria, pelvic pain, urgency, vaginal bleeding, vaginal discharge and vaginal pain.       Objective   /86 (BP Location: Left arm, Patient Position: Sitting, Cuff Size: Standard)   Wt 75.3 kg (166 lb)   LMP 06/10/2025 (Within Days)   BMI 26.79 kg/m²      Physical Exam  Vitals reviewed.   Constitutional:       General: She is not in acute distress.     Appearance: Normal appearance. She is not ill-appearing or toxic-appearing.   HENT:      Head: Normocephalic and atraumatic.      Jaw: There is normal jaw occlusion.      Nose: Nose normal.      Mouth/Throat:      Lips: Pink.     Eyes:      General: Lids are normal.      Extraocular Movements: Extraocular movements intact.     Neck:      Thyroid: No thyroid mass, thyromegaly or thyroid tenderness.      Trachea: Trachea normal. No tracheostomy or tracheal deviation.   Chest:   Breasts:     Breasts are symmetrical.      Right: Normal. No swelling, bleeding, inverted nipple, mass, nipple discharge, skin change or tenderness.      Left: Normal. No swelling, bleeding, inverted nipple,  mass, nipple discharge, skin change or tenderness.   Abdominal:      General: There is no distension.      Palpations: Abdomen is soft. There is no fluid wave, hepatomegaly or splenomegaly.      Tenderness: There is no abdominal tenderness. There is no guarding or rebound.   Genitourinary:     Exam position: Lithotomy position.      Pubic Area: No rash.       Labia:         Right: No rash, tenderness, lesion or injury.         Left: No rash, tenderness, lesion or injury.       Urethra: No prolapse or urethral lesion.      Vagina: Normal.      Cervix: Normal. No cervical motion tenderness or lesion.      Uterus: Normal. Not tender.       Adnexa: Right adnexa normal and left adnexa normal.        Right: No mass, tenderness or fullness.          Left: No mass, tenderness or fullness.        Comments: Approximately Fundal Size: 8 cm    Musculoskeletal:      Cervical back: Neck supple. No edema or erythema. Normal range of motion.   Lymphadenopathy:      Cervical: No cervical adenopathy.      Right cervical: No superficial or deep cervical adenopathy.     Left cervical: No superficial or deep cervical adenopathy.      Upper Body:      Right upper body: No supraclavicular or axillary adenopathy.      Left upper body: No supraclavicular or axillary adenopathy.     Skin:     General: Skin is cool and dry.     Neurological:      Mental Status: She is alert.     Psychiatric:         Behavior: Behavior is cooperative.              [1]   Patient Active Problem List  Diagnosis    Iron deficiency anemia    Urinary frequency    Fatigue    Class 1 obesity with body mass index (BMI) of 31.0 to 31.9 in adult    Insomnia    IFG (impaired fasting glucose)

## 2025-06-23 ENCOUNTER — TELEPHONE (OUTPATIENT)
Dept: FAMILY MEDICINE CLINIC | Facility: CLINIC | Age: 30
End: 2025-06-23

## 2025-06-23 DIAGNOSIS — E66.811 CLASS 1 OBESITY WITH BODY MASS INDEX (BMI) OF 30.0 TO 30.9 IN ADULT, UNSPECIFIED OBESITY TYPE, UNSPECIFIED WHETHER SERIOUS COMORBIDITY PRESENT: ICD-10-CM

## 2025-06-23 RX ORDER — TIRZEPATIDE 15 MG/.5ML
15 INJECTION, SOLUTION SUBCUTANEOUS WEEKLY
Qty: 2 ML | Refills: 1 | Status: SHIPPED | OUTPATIENT
Start: 2025-06-23

## 2025-06-23 NOTE — TELEPHONE ENCOUNTER
Called Pt no answer LM advising CB. Per SHAHID Zepbound has been approved. Please schedule her a visit to address her acne. Virtual is okay if needed.

## 2025-06-26 ENCOUNTER — TELEPHONE (OUTPATIENT)
Age: 30
End: 2025-06-26

## 2025-06-26 NOTE — TELEPHONE ENCOUNTER
Left message reminding patient to have labs completed for upcoming appointment 6/27/25.  Provided call back number for any questions/concerns [phone].

## 2025-07-02 ENCOUNTER — TELEPHONE (OUTPATIENT)
Dept: FAMILY MEDICINE CLINIC | Facility: CLINIC | Age: 30
End: 2025-07-02

## 2025-07-02 NOTE — TELEPHONE ENCOUNTER
Called pt no answer, call cannot be completed at this time prompt. Please ask her to join [employer].

## 2025-07-30 DIAGNOSIS — G47.00 INSOMNIA, UNSPECIFIED TYPE: ICD-10-CM

## 2025-07-30 DIAGNOSIS — E66.811 CLASS 1 OBESITY WITH BODY MASS INDEX (BMI) OF 30.0 TO 30.9 IN ADULT, UNSPECIFIED OBESITY TYPE, UNSPECIFIED WHETHER SERIOUS COMORBIDITY PRESENT: ICD-10-CM

## 2025-07-31 RX ORDER — TRAZODONE HYDROCHLORIDE 50 MG/1
50 TABLET ORAL
Qty: 30 TABLET | Refills: 2 | Status: SHIPPED | OUTPATIENT
Start: 2025-07-31

## 2025-08-01 RX ORDER — TIRZEPATIDE 15 MG/.5ML
15 INJECTION, SOLUTION SUBCUTANEOUS WEEKLY
Qty: 2 ML | Refills: 0 | OUTPATIENT
Start: 2025-08-01

## 2025-08-13 ENCOUNTER — TELEPHONE (OUTPATIENT)
Age: 30
End: 2025-08-13

## 2025-08-14 ENCOUNTER — TELEPHONE (OUTPATIENT)
Age: 30
End: 2025-08-14

## 2025-08-19 ENCOUNTER — APPOINTMENT (OUTPATIENT)
Dept: LAB | Facility: CLINIC | Age: 30
End: 2025-08-19
Attending: PHYSICIAN ASSISTANT
Payer: COMMERCIAL

## 2025-08-19 DIAGNOSIS — D50.9 IRON DEFICIENCY ANEMIA, UNSPECIFIED IRON DEFICIENCY ANEMIA TYPE: ICD-10-CM

## 2025-08-19 DIAGNOSIS — E66.811 CLASS 1 OBESITY WITH BODY MASS INDEX (BMI) OF 31.0 TO 31.9 IN ADULT, UNSPECIFIED OBESITY TYPE, UNSPECIFIED WHETHER SERIOUS COMORBIDITY PRESENT: ICD-10-CM

## 2025-08-19 DIAGNOSIS — L68.0 HIRSUTISM: ICD-10-CM

## 2025-08-19 LAB
25(OH)D3 SERPL-MCNC: 27.9 NG/ML (ref 30–100)
BASOPHILS # BLD AUTO: 0.05 THOUSANDS/ÂΜL (ref 0–0.1)
BASOPHILS NFR BLD AUTO: 1 % (ref 0–1)
EOSINOPHIL # BLD AUTO: 0.06 THOUSAND/ÂΜL (ref 0–0.61)
EOSINOPHIL NFR BLD AUTO: 1 % (ref 0–6)
ERYTHROCYTE [DISTWIDTH] IN BLOOD BY AUTOMATED COUNT: 15.9 % (ref 11.6–15.1)
FERRITIN SERPL-MCNC: 6 NG/ML (ref 30–307)
HCT VFR BLD AUTO: 35.9 % (ref 34.8–46.1)
HGB BLD-MCNC: 11 G/DL (ref 11.5–15.4)
IMM GRANULOCYTES # BLD AUTO: 0.01 THOUSAND/UL (ref 0–0.2)
IMM GRANULOCYTES NFR BLD AUTO: 0 % (ref 0–2)
IRON SATN MFR SERPL: 6 % (ref 15–50)
IRON SERPL-MCNC: 27 UG/DL (ref 50–212)
LYMPHOCYTES # BLD AUTO: 1.83 THOUSANDS/ÂΜL (ref 0.6–4.47)
LYMPHOCYTES NFR BLD AUTO: 43 % (ref 14–44)
MCH RBC QN AUTO: 26.4 PG (ref 26.8–34.3)
MCHC RBC AUTO-ENTMCNC: 30.6 G/DL (ref 31.4–37.4)
MCV RBC AUTO: 86 FL (ref 82–98)
MONOCYTES # BLD AUTO: 0.3 THOUSAND/ÂΜL (ref 0.17–1.22)
MONOCYTES NFR BLD AUTO: 7 % (ref 4–12)
NEUTROPHILS # BLD AUTO: 2.03 THOUSANDS/ÂΜL (ref 1.85–7.62)
NEUTS SEG NFR BLD AUTO: 48 % (ref 43–75)
NRBC BLD AUTO-RTO: 0 /100 WBCS
PLATELET # BLD AUTO: 412 THOUSANDS/UL (ref 149–390)
PMV BLD AUTO: 9.9 FL (ref 8.9–12.7)
RBC # BLD AUTO: 4.17 MILLION/UL (ref 3.81–5.12)
TIBC SERPL-MCNC: 425.6 UG/DL (ref 250–450)
TRANSFERRIN SERPL-MCNC: 304 MG/DL (ref 203–362)
UIBC SERPL-MCNC: 399 UG/DL (ref 155–355)
WBC # BLD AUTO: 4.28 THOUSAND/UL (ref 4.31–10.16)

## 2025-08-19 PROCEDURE — 83550 IRON BINDING TEST: CPT

## 2025-08-19 PROCEDURE — 84402 ASSAY OF FREE TESTOSTERONE: CPT

## 2025-08-19 PROCEDURE — 82627 DEHYDROEPIANDROSTERONE: CPT

## 2025-08-19 PROCEDURE — 84270 ASSAY OF SEX HORMONE GLOBUL: CPT

## 2025-08-19 PROCEDURE — 85025 COMPLETE CBC W/AUTO DIFF WBC: CPT

## 2025-08-19 PROCEDURE — 82728 ASSAY OF FERRITIN: CPT

## 2025-08-19 PROCEDURE — 82157 ASSAY OF ANDROSTENEDIONE: CPT

## 2025-08-19 PROCEDURE — 84403 ASSAY OF TOTAL TESTOSTERONE: CPT

## 2025-08-19 PROCEDURE — 83540 ASSAY OF IRON: CPT

## 2025-08-19 PROCEDURE — 82306 VITAMIN D 25 HYDROXY: CPT

## 2025-08-19 PROCEDURE — 36415 COLL VENOUS BLD VENIPUNCTURE: CPT

## 2025-08-19 PROCEDURE — 83498 ASY HYDROXYPROGESTERONE 17-D: CPT

## 2025-08-20 ENCOUNTER — TELEPHONE (OUTPATIENT)
Age: 30
End: 2025-08-20

## 2025-08-20 LAB
DHEA-S SERPL-MCNC: 251 UG/DL (ref 84.8–378)
SHBG SERPL-SCNC: 93.9 NMOL/L (ref 24.6–122)

## 2025-08-22 ENCOUNTER — TELEPHONE (OUTPATIENT)
Age: 30
End: 2025-08-22

## 2025-08-22 ENCOUNTER — OFFICE VISIT (OUTPATIENT)
Age: 30
End: 2025-08-22
Payer: COMMERCIAL

## 2025-08-22 VITALS
WEIGHT: 155 LBS | DIASTOLIC BLOOD PRESSURE: 91 MMHG | BODY MASS INDEX: 24.91 KG/M2 | SYSTOLIC BLOOD PRESSURE: 142 MMHG | HEART RATE: 78 BPM | RESPIRATION RATE: 18 BRPM | OXYGEN SATURATION: 100 % | HEIGHT: 66 IN | TEMPERATURE: 97.9 F

## 2025-08-22 DIAGNOSIS — D50.0 IRON DEFICIENCY ANEMIA DUE TO CHRONIC BLOOD LOSS: ICD-10-CM

## 2025-08-22 DIAGNOSIS — D50.9 IRON DEFICIENCY ANEMIA, UNSPECIFIED IRON DEFICIENCY ANEMIA TYPE: Primary | ICD-10-CM

## 2025-08-22 LAB
17OHP SERPL-MCNC: 36 NG/DL
TESTOST FREE SERPL-MCNC: 1.1 PG/ML (ref 0–4.2)
TESTOST SERPL-MCNC: 31 NG/DL (ref 13–71)

## 2025-08-22 PROCEDURE — 99213 OFFICE O/P EST LOW 20 MIN: CPT | Performed by: NURSE PRACTITIONER

## 2025-08-22 RX ORDER — SODIUM CHLORIDE 9 MG/ML
20 INJECTION, SOLUTION INTRAVENOUS ONCE
OUTPATIENT
Start: 2025-09-11

## 2025-08-24 LAB — ANDROST SERPL-MCNC: 103 NG/DL (ref 41–262)

## (undated) DEVICE — PVC URETHRAL CATHETER: Brand: DOVER

## (undated) DEVICE — CURETTE VACURETTE STR 8MM

## (undated) DEVICE — COLLECTION SET, DISPOSABLE WITH HANDLE AND TAPERED FITTINGS TUBING, 6 FT (183 CM): Brand: GYRUS ACMI

## (undated) DEVICE — GLOVE INDICATOR PI UNDERGLOVE SZ 6.5 BLUE

## (undated) DEVICE — GLOVE PI ULTRA TOUCH SZ.7.0

## (undated) DEVICE — BETHLEHEM UNIVERSAL MINOR VAG: Brand: CARDINAL HEALTH